# Patient Record
Sex: FEMALE | Race: WHITE | NOT HISPANIC OR LATINO | Employment: UNEMPLOYED | URBAN - METROPOLITAN AREA
[De-identification: names, ages, dates, MRNs, and addresses within clinical notes are randomized per-mention and may not be internally consistent; named-entity substitution may affect disease eponyms.]

---

## 2023-04-25 DIAGNOSIS — I10 ESSENTIAL (PRIMARY) HYPERTENSION: ICD-10-CM

## 2023-04-25 DIAGNOSIS — E03.8 OTHER SPECIFIED HYPOTHYROIDISM: ICD-10-CM

## 2023-04-26 RX ORDER — LEVOTHYROXINE SODIUM 25 UG/1
TABLET ORAL
Qty: 30 TABLET | Refills: 1 | Status: SHIPPED | OUTPATIENT
Start: 2023-04-26 | End: 2023-06-18

## 2023-04-26 RX ORDER — AMLODIPINE BESYLATE 10 MG/1
TABLET ORAL
Qty: 30 TABLET | Refills: 1 | Status: SHIPPED | OUTPATIENT
Start: 2023-04-26 | End: 2023-06-18

## 2023-04-26 RX ORDER — HYDROCHLOROTHIAZIDE 25 MG/1
TABLET ORAL
Qty: 30 TABLET | Refills: 1 | Status: SHIPPED | OUTPATIENT
Start: 2023-04-26 | End: 2023-06-18

## 2023-04-26 RX ORDER — LISINOPRIL 40 MG/1
TABLET ORAL
Qty: 30 TABLET | Refills: 1 | Status: SHIPPED | OUTPATIENT
Start: 2023-04-26 | End: 2023-06-18

## 2023-05-08 ENCOUNTER — OFFICE VISIT (OUTPATIENT)
Dept: PRIMARY CARE | Facility: CLINIC | Age: 53
End: 2023-05-08
Payer: COMMERCIAL

## 2023-05-08 VITALS
SYSTOLIC BLOOD PRESSURE: 140 MMHG | TEMPERATURE: 97.7 F | WEIGHT: 229.6 LBS | BODY MASS INDEX: 37.06 KG/M2 | DIASTOLIC BLOOD PRESSURE: 84 MMHG

## 2023-05-08 DIAGNOSIS — M79.672 PAIN IN BOTH FEET: Primary | ICD-10-CM

## 2023-05-08 DIAGNOSIS — E78.5 HYPERLIPIDEMIA, UNSPECIFIED HYPERLIPIDEMIA TYPE: ICD-10-CM

## 2023-05-08 DIAGNOSIS — E03.8 SUBCLINICAL HYPOTHYROIDISM: ICD-10-CM

## 2023-05-08 DIAGNOSIS — I10 BENIGN ESSENTIAL HYPERTENSION: ICD-10-CM

## 2023-05-08 DIAGNOSIS — E11.9 CONTROLLED TYPE 2 DIABETES MELLITUS WITHOUT COMPLICATION, WITHOUT LONG-TERM CURRENT USE OF INSULIN (MULTI): ICD-10-CM

## 2023-05-08 DIAGNOSIS — D12.6 ADENOMATOUS POLYP OF COLON, UNSPECIFIED PART OF COLON: ICD-10-CM

## 2023-05-08 DIAGNOSIS — D72.829 LEUKOCYTOSIS, UNSPECIFIED TYPE: ICD-10-CM

## 2023-05-08 DIAGNOSIS — R21 RASH: ICD-10-CM

## 2023-05-08 DIAGNOSIS — M79.671 PAIN IN BOTH FEET: Primary | ICD-10-CM

## 2023-05-08 DIAGNOSIS — F32.A DEPRESSIVE DISORDER: ICD-10-CM

## 2023-05-08 DIAGNOSIS — R74.8 ALKALINE PHOSPHATASE ELEVATION: ICD-10-CM

## 2023-05-08 DIAGNOSIS — D64.9 ANEMIA, UNSPECIFIED TYPE: ICD-10-CM

## 2023-05-08 PROBLEM — K63.5 COLON POLYPS: Status: ACTIVE | Noted: 2023-05-08

## 2023-05-08 PROBLEM — K76.0 FATTY LIVER: Status: ACTIVE | Noted: 2023-05-08

## 2023-05-08 PROBLEM — D50.0 IRON DEFICIENCY ANEMIA DUE TO CHRONIC BLOOD LOSS: Status: ACTIVE | Noted: 2019-06-14

## 2023-05-08 PROCEDURE — 3077F SYST BP >= 140 MM HG: CPT | Performed by: INTERNAL MEDICINE

## 2023-05-08 PROCEDURE — 99214 OFFICE O/P EST MOD 30 MIN: CPT | Performed by: INTERNAL MEDICINE

## 2023-05-08 PROCEDURE — 3079F DIAST BP 80-89 MM HG: CPT | Performed by: INTERNAL MEDICINE

## 2023-05-08 PROCEDURE — 1036F TOBACCO NON-USER: CPT | Performed by: INTERNAL MEDICINE

## 2023-05-08 PROCEDURE — 3046F HEMOGLOBIN A1C LEVEL >9.0%: CPT | Performed by: INTERNAL MEDICINE

## 2023-05-08 PROCEDURE — 4010F ACE/ARB THERAPY RXD/TAKEN: CPT | Performed by: INTERNAL MEDICINE

## 2023-05-08 RX ORDER — CHOLECALCIFEROL (VITAMIN D3) 25 MCG
25 TABLET ORAL DAILY
COMMUNITY
Start: 2020-05-21 | End: 2023-11-08 | Stop reason: WASHOUT

## 2023-05-08 RX ORDER — VITAMIN E MIXED 400 UNIT
400 CAPSULE ORAL DAILY
COMMUNITY
End: 2023-09-25 | Stop reason: SDUPTHER

## 2023-05-08 RX ORDER — MULTIVITAMIN
1 TABLET ORAL DAILY
COMMUNITY

## 2023-05-08 RX ORDER — EPINEPHRINE 0.22MG
1 AEROSOL WITH ADAPTER (ML) INHALATION DAILY
COMMUNITY
Start: 2015-03-09

## 2023-05-08 RX ORDER — POLYETHYLENE GLYCOL 3350 17 G/17G
POWDER, FOR SOLUTION ORAL
COMMUNITY
Start: 2016-10-21

## 2023-05-08 RX ORDER — METOPROLOL SUCCINATE 50 MG/1
50 TABLET, EXTENDED RELEASE ORAL DAILY
COMMUNITY
End: 2023-08-29 | Stop reason: SDUPTHER

## 2023-05-08 RX ORDER — INSULIN GLARGINE 100 [IU]/ML
INJECTION, SOLUTION SUBCUTANEOUS
COMMUNITY

## 2023-05-08 RX ORDER — SIMVASTATIN 40 MG/1
40 TABLET, FILM COATED ORAL DAILY
COMMUNITY
End: 2023-08-29 | Stop reason: SDUPTHER

## 2023-05-08 RX ORDER — PANTOPRAZOLE SODIUM 40 MG/1
40 TABLET, DELAYED RELEASE ORAL DAILY
COMMUNITY

## 2023-05-08 RX ORDER — PEN NEEDLE, DIABETIC 31 GX5/16"
NEEDLE, DISPOSABLE MISCELLANEOUS
COMMUNITY
Start: 2023-04-29 | End: 2023-09-25 | Stop reason: SDUPTHER

## 2023-05-08 RX ORDER — BLOOD SUGAR DIAGNOSTIC
STRIP MISCELLANEOUS 2 TIMES DAILY
COMMUNITY

## 2023-05-08 RX ORDER — LANCETS
EACH MISCELLANEOUS
COMMUNITY
Start: 2022-06-03 | End: 2023-11-08 | Stop reason: WASHOUT

## 2023-05-08 RX ORDER — NAPROXEN SODIUM 220 MG/1
TABLET, FILM COATED ORAL
COMMUNITY
Start: 2014-03-06

## 2023-05-08 RX ORDER — METFORMIN HYDROCHLORIDE 1000 MG/1
1000 TABLET ORAL
COMMUNITY
End: 2023-08-29 | Stop reason: SDUPTHER

## 2023-05-08 RX ORDER — GLIMEPIRIDE 4 MG/1
TABLET ORAL
COMMUNITY

## 2023-05-08 RX ORDER — AMLODIPINE BESYLATE 10 MG/1
1 TABLET ORAL DAILY
COMMUNITY
Start: 2018-04-24 | End: 2023-05-08 | Stop reason: SDUPTHER

## 2023-05-08 ASSESSMENT — PATIENT HEALTH QUESTIONNAIRE - PHQ9
1. LITTLE INTEREST OR PLEASURE IN DOING THINGS: NOT AT ALL
2. FEELING DOWN, DEPRESSED OR HOPELESS: NOT AT ALL
SUM OF ALL RESPONSES TO PHQ9 QUESTIONS 1 AND 2: 0

## 2023-05-08 NOTE — PROGRESS NOTES
"Subjective   Patient ID: Yu Small is a 52 y.o. female who presents for Follow-up.    HPI   Overall well  #1 hypertension- no home Bps. At endo office \"good\" (~130/70 per pt).  #2 diabetes- following w/ endo, a1c`8.3  #3 hyperlipidemia- liitle exercise.   #4 anemia/leukocytosis/thrombocytosis- no bleeding/bruising  #5 rt shoulder- f/u ortho, information provided for second opinions.  #6 leg cramps-stable.  #7 tachycardia- normal Holter monitor. Follow-up cardiology  #8 fatty liver- f/u GI. appt pending, s/p Bx.   #9 subclinical hypothyroidism- better on rx. retest 6 mths  #10 colon polyps. s/p colo (dr stout) 4/21--> f/u GI.       Review of Systems   All other systems reviewed and are negative.      Objective   /84   Temp 36.5 °C (97.7 °F)   Wt 104 kg (229 lb 9.6 oz)   BMI 37.06 kg/m²     Physical Exam  Constitutional:       Appearance: Normal appearance.   Cardiovascular:      Rate and Rhythm: Normal rate and regular rhythm.      Pulses: Normal pulses.      Heart sounds: Normal heart sounds. No murmur heard.  Pulmonary:      Effort: Pulmonary effort is normal. No respiratory distress.      Breath sounds: Normal breath sounds. No wheezing, rhonchi or rales.   Neurological:      Mental Status: She is alert.   Psychiatric:         Mood and Affect: Mood normal.         Behavior: Behavior normal.         Thought Content: Thought content normal.         Judgment: Judgment normal.       Lab Results   Component Value Date    WBC 11.2 04/22/2022    HGB 13.1 04/22/2022    HCT 40.7 04/22/2022     04/22/2022    CHOL 127 01/27/2023    TRIG 169 (H) 01/27/2023    HDL 30.4 (A) 01/27/2023    ALT 33 01/27/2023    AST 21 01/27/2023     01/27/2023    K 3.7 01/27/2023    CL 96 (L) 01/27/2023    CREATININE 0.70 01/27/2023    BUN 16 01/27/2023    CO2 30 01/27/2023    TSH 3.34 02/19/2022    INR 1.0 02/10/2021    HGBA1C 9.1 (A) 01/27/2023     par    Assessment/Plan   Problem List Items Addressed This Visit  "         Circulatory    Benign essential hypertension       Digestive    Colon polyps       Endocrine/Metabolic    Controlled diabetes mellitus (CMS/HCC)    Subclinical hypothyroidism       Hematologic    Anemia    Leukocytosis       Other    Alkaline phosphatase elevation    Depressive disorder    Hyperlipidemia     Other Visit Diagnoses       Pain in both feet    -  Primary    Rash              #1 hypertension-  increased a little. Continue current treatment. Diet and exercise reviewed, she is committing to exercise daily.   #2 diabetes- follow-up endocrinology, appt pending  #3 hyperlipidemia- on target   #4 anemia/leukocytosis/thrombocytosis- Follow-up with GI as well as hematology.   #5 rt shoulder- f/u ortho, information provided for second opinions.  #6 leg cramps-stable.  #7 tachycardia- normal Holter monitor. Follow-up cardiology  #8 fatty liver- f/u GI. appt pending, s/p Bx.   Appt pending  #9 subclinical hypothyroidism- better on rx. retest 6 mths  #10 colon polyps. s/p colo (dr stout) 4/21--> f/u GI, appt pending     Increased BMI- discussed diet and exercise.  Follow-up gynecology for Pap/mammogram.  Recommend shingles vaccine

## 2023-06-06 DIAGNOSIS — I10 ESSENTIAL (PRIMARY) HYPERTENSION: ICD-10-CM

## 2023-06-06 DIAGNOSIS — E03.8 OTHER SPECIFIED HYPOTHYROIDISM: ICD-10-CM

## 2023-06-18 RX ORDER — AMLODIPINE BESYLATE 10 MG/1
TABLET ORAL
Qty: 30 TABLET | Refills: 1 | Status: SHIPPED | OUTPATIENT
Start: 2023-06-18 | End: 2023-08-15

## 2023-06-18 RX ORDER — LISINOPRIL 40 MG/1
TABLET ORAL
Qty: 30 TABLET | Refills: 1 | Status: SHIPPED | OUTPATIENT
Start: 2023-06-18 | End: 2023-08-15

## 2023-06-18 RX ORDER — LEVOTHYROXINE SODIUM 25 UG/1
TABLET ORAL
Qty: 30 TABLET | Refills: 1 | Status: SHIPPED | OUTPATIENT
Start: 2023-06-18 | End: 2023-08-15

## 2023-06-18 RX ORDER — HYDROCHLOROTHIAZIDE 25 MG/1
TABLET ORAL
Qty: 30 TABLET | Refills: 1 | Status: SHIPPED | OUTPATIENT
Start: 2023-06-18 | End: 2023-07-31

## 2023-07-31 ENCOUNTER — OFFICE VISIT (OUTPATIENT)
Dept: PRIMARY CARE | Facility: CLINIC | Age: 53
End: 2023-07-31
Payer: COMMERCIAL

## 2023-07-31 VITALS
DIASTOLIC BLOOD PRESSURE: 72 MMHG | SYSTOLIC BLOOD PRESSURE: 140 MMHG | BODY MASS INDEX: 36.96 KG/M2 | TEMPERATURE: 97.3 F | WEIGHT: 229 LBS

## 2023-07-31 DIAGNOSIS — I10 BENIGN ESSENTIAL HYPERTENSION: ICD-10-CM

## 2023-07-31 DIAGNOSIS — E11.9 TYPE 2 DIABETES MELLITUS WITHOUT COMPLICATION, WITHOUT LONG-TERM CURRENT USE OF INSULIN (MULTI): ICD-10-CM

## 2023-07-31 DIAGNOSIS — D64.9 ANEMIA, UNSPECIFIED TYPE: ICD-10-CM

## 2023-07-31 DIAGNOSIS — E11.9 CONTROLLED TYPE 2 DIABETES MELLITUS WITHOUT COMPLICATION, WITHOUT LONG-TERM CURRENT USE OF INSULIN (MULTI): ICD-10-CM

## 2023-07-31 DIAGNOSIS — G62.9 NEUROPATHY: ICD-10-CM

## 2023-07-31 DIAGNOSIS — E03.8 SUBCLINICAL HYPOTHYROIDISM: ICD-10-CM

## 2023-07-31 DIAGNOSIS — E66.01 CLASS 2 SEVERE OBESITY WITH SERIOUS COMORBIDITY AND BODY MASS INDEX (BMI) OF 36.0 TO 36.9 IN ADULT, UNSPECIFIED OBESITY TYPE (MULTI): Primary | ICD-10-CM

## 2023-07-31 DIAGNOSIS — R74.8 ABNORMAL AST AND ALT: ICD-10-CM

## 2023-07-31 DIAGNOSIS — K76.0 FATTY LIVER: ICD-10-CM

## 2023-07-31 PROCEDURE — 3046F HEMOGLOBIN A1C LEVEL >9.0%: CPT | Performed by: INTERNAL MEDICINE

## 2023-07-31 PROCEDURE — 99214 OFFICE O/P EST MOD 30 MIN: CPT | Performed by: INTERNAL MEDICINE

## 2023-07-31 PROCEDURE — 3008F BODY MASS INDEX DOCD: CPT | Performed by: INTERNAL MEDICINE

## 2023-07-31 PROCEDURE — 4010F ACE/ARB THERAPY RXD/TAKEN: CPT | Performed by: INTERNAL MEDICINE

## 2023-07-31 PROCEDURE — 1036F TOBACCO NON-USER: CPT | Performed by: INTERNAL MEDICINE

## 2023-07-31 PROCEDURE — 3077F SYST BP >= 140 MM HG: CPT | Performed by: INTERNAL MEDICINE

## 2023-07-31 PROCEDURE — 3078F DIAST BP <80 MM HG: CPT | Performed by: INTERNAL MEDICINE

## 2023-07-31 RX ORDER — GABAPENTIN 100 MG/1
100 CAPSULE ORAL NIGHTLY
Qty: 90 CAPSULE | Refills: 1 | Status: SHIPPED | OUTPATIENT
Start: 2023-07-31 | End: 2024-05-15

## 2023-07-31 RX ORDER — CHLORTHALIDONE 25 MG/1
25 TABLET ORAL DAILY
Qty: 90 TABLET | Refills: 2 | Status: SHIPPED | OUTPATIENT
Start: 2023-07-31 | End: 2023-09-01

## 2023-07-31 ASSESSMENT — PATIENT HEALTH QUESTIONNAIRE - PHQ9
2. FEELING DOWN, DEPRESSED OR HOPELESS: NOT AT ALL
1. LITTLE INTEREST OR PLEASURE IN DOING THINGS: NOT AT ALL
SUM OF ALL RESPONSES TO PHQ9 QUESTIONS 1 AND 2: 0

## 2023-07-31 NOTE — PROGRESS NOTES
"Subjective   Patient ID: Yu Small is a 53 y.o. female who presents for Follow-up (HTN / neuropathy in feet).    HPI   Overall well  #1 hypertension- no home Bps. At endo office \"good\" (~130/70 per pt).  #2 diabetes- following w/ endo, a1c`8.3  #3 hyperlipidemia- liitle exercise.   #4 anemia/leukocytosis/thrombocytosis- no bleeding/bruising  #5 rt shoulder- f/u ortho, information provided for second opinions.  #6 leg cramps-stable.  #7 tachycardia- normal Holter monitor. Follow-up cardiology  #8 fatty liver- f/u GI. appt pending, s/p Bx.   #9 subclinical hypothyroidism- better on rx. retest 6 mths  #10 colon polyps. s/p colo (dr stout) 4/21--> f/u GI.       Review of Systems   All other systems reviewed and are negative.      Objective   /72   Temp 36.3 °C (97.3 °F)   Wt 104 kg (229 lb)   BMI 36.96 kg/m²     Physical Exam  Constitutional:       Appearance: Normal appearance.   Cardiovascular:      Rate and Rhythm: Normal rate and regular rhythm.      Pulses: Normal pulses.      Heart sounds: Normal heart sounds. No murmur heard.  Pulmonary:      Effort: Pulmonary effort is normal. No respiratory distress.      Breath sounds: Normal breath sounds. No wheezing, rhonchi or rales.   Neurological:      Mental Status: She is alert.   Psychiatric:         Mood and Affect: Mood normal.         Behavior: Behavior normal.         Thought Content: Thought content normal.         Judgment: Judgment normal.       Lab Results   Component Value Date    WBC 11.2 04/22/2022    HGB 13.1 04/22/2022    HCT 40.7 04/22/2022     04/22/2022    CHOL 127 01/27/2023    TRIG 169 (H) 01/27/2023    HDL 30.4 (A) 01/27/2023    ALT 33 01/27/2023    AST 21 01/27/2023     01/27/2023    K 3.7 01/27/2023    CL 96 (L) 01/27/2023    CREATININE 0.70 01/27/2023    BUN 16 01/27/2023    CO2 30 01/27/2023    TSH 3.34 02/19/2022    INR 1.0 02/10/2021    HGBA1C 9.1 (A) 01/27/2023     par    Assessment/Plan   Problem List Items " Addressed This Visit    None  Visit Diagnoses       Class 2 severe obesity with serious comorbidity and body mass index (BMI) of 36.0 to 36.9 in adult, unspecified obesity type (CMS/HCC)    -  Primary          #1 hypertension- stop hydrochlorothiazide, will change to chlorthaladone.  Labs 1 week.  Increase exercise.   #2 diabetes- follow-up endocrinology, appt pending  #3 hyperlipidemia- on target   #4 anemia/leukocytosis/thrombocytosis- Follow-up with GI as well as hematology.   #5 rt shoulder- f/u ortho, information provided for second opinions.  #6 leg cramps-stable.  #7 tachycardia- normal Holter monitor. Follow-up cardiology  #8 fatty liver- f/u GI. appt pending, s/p Bx.   Appt pending  #9 subclinical hypothyroidism- better on rx. retest 6 mths  #10 colon polyps. s/p colo (dr stout) 4/21--> f/u GI, appt pending 2/24  #11 neuropathy- reviewed foot care.  f/u  w/ podiatry.  Gabapentin 100 mg po qhs     Increased BMI- discussed diet and exercise.  Follow-up gynecology for Pap/mammogram.  Recommend shingles vaccine

## 2023-08-09 ENCOUNTER — APPOINTMENT (OUTPATIENT)
Dept: PRIMARY CARE | Facility: CLINIC | Age: 53
End: 2023-08-09
Payer: COMMERCIAL

## 2023-08-11 DIAGNOSIS — I10 ESSENTIAL (PRIMARY) HYPERTENSION: ICD-10-CM

## 2023-08-11 DIAGNOSIS — E03.8 OTHER SPECIFIED HYPOTHYROIDISM: ICD-10-CM

## 2023-08-15 RX ORDER — AMLODIPINE BESYLATE 10 MG/1
TABLET ORAL
Qty: 30 TABLET | Refills: 1 | Status: SHIPPED | OUTPATIENT
Start: 2023-08-15 | End: 2023-10-26 | Stop reason: SDUPTHER

## 2023-08-15 RX ORDER — LEVOTHYROXINE SODIUM 25 UG/1
TABLET ORAL
Qty: 30 TABLET | Refills: 1 | Status: SHIPPED | OUTPATIENT
Start: 2023-08-15 | End: 2023-10-26 | Stop reason: SDUPTHER

## 2023-08-15 RX ORDER — LISINOPRIL 40 MG/1
TABLET ORAL
Qty: 30 TABLET | Refills: 1 | Status: SHIPPED | OUTPATIENT
Start: 2023-08-15 | End: 2023-10-26 | Stop reason: SDUPTHER

## 2023-08-16 ENCOUNTER — LAB (OUTPATIENT)
Dept: LAB | Facility: LAB | Age: 53
End: 2023-08-16
Payer: COMMERCIAL

## 2023-08-16 DIAGNOSIS — E03.8 SUBCLINICAL HYPOTHYROIDISM: ICD-10-CM

## 2023-08-16 DIAGNOSIS — I10 BENIGN ESSENTIAL HYPERTENSION: ICD-10-CM

## 2023-08-16 LAB
ALANINE AMINOTRANSFERASE (SGPT) (U/L) IN SER/PLAS: 31 U/L (ref 7–45)
ALBUMIN (G/DL) IN SER/PLAS: 4.3 G/DL (ref 3.4–5)
ALKALINE PHOSPHATASE (U/L) IN SER/PLAS: 94 U/L (ref 33–110)
ANION GAP IN SER/PLAS: 16 MMOL/L (ref 10–20)
ASPARTATE AMINOTRANSFERASE (SGOT) (U/L) IN SER/PLAS: 20 U/L (ref 9–39)
BILIRUBIN TOTAL (MG/DL) IN SER/PLAS: 0.4 MG/DL (ref 0–1.2)
CALCIUM (MG/DL) IN SER/PLAS: 10 MG/DL (ref 8.6–10.3)
CARBON DIOXIDE, TOTAL (MMOL/L) IN SER/PLAS: 27 MMOL/L (ref 21–32)
CHLORIDE (MMOL/L) IN SER/PLAS: 98 MMOL/L (ref 98–107)
CREATININE (MG/DL) IN SER/PLAS: 0.81 MG/DL (ref 0.5–1.05)
GFR FEMALE: 86 ML/MIN/1.73M2
GLUCOSE (MG/DL) IN SER/PLAS: 144 MG/DL (ref 74–99)
POTASSIUM (MMOL/L) IN SER/PLAS: 4.4 MMOL/L (ref 3.5–5.3)
PROTEIN TOTAL: 7.6 G/DL (ref 6.4–8.2)
SODIUM (MMOL/L) IN SER/PLAS: 137 MMOL/L (ref 136–145)
THYROTROPIN (MIU/L) IN SER/PLAS BY DETECTION LIMIT <= 0.05 MIU/L: 3.82 MIU/L (ref 0.44–3.98)
UREA NITROGEN (MG/DL) IN SER/PLAS: 17 MG/DL (ref 6–23)

## 2023-08-16 PROCEDURE — 80053 COMPREHEN METABOLIC PANEL: CPT

## 2023-08-16 PROCEDURE — 84443 ASSAY THYROID STIM HORMONE: CPT

## 2023-08-16 PROCEDURE — 36415 COLL VENOUS BLD VENIPUNCTURE: CPT

## 2023-08-21 ENCOUNTER — TELEPHONE (OUTPATIENT)
Dept: PRIMARY CARE | Facility: CLINIC | Age: 53
End: 2023-08-21
Payer: COMMERCIAL

## 2023-08-21 NOTE — TELEPHONE ENCOUNTER
Pt left a msg  stating that you recently started her on a new diuretic, and now she is having side effects of HA's , nausea, diarrhea, and blurred vision.

## 2023-08-22 NOTE — TELEPHONE ENCOUNTER
I spoke with the pt and she said that these symptoms all started a couple days after starting the Chlorthalidone on Aug 6th.  She thought she read that this was a med related to Sulfa.  She is allergic to Sulfas.  She said that with the Gabapentin, she has only taken it a couple times.

## 2023-08-22 NOTE — TELEPHONE ENCOUNTER
Called and left a detailed vm asking her to call back with the date of when her symptoms started.

## 2023-08-28 ENCOUNTER — TELEPHONE (OUTPATIENT)
Dept: PRIMARY CARE | Facility: CLINIC | Age: 53
End: 2023-08-28
Payer: COMMERCIAL

## 2023-08-28 NOTE — TELEPHONE ENCOUNTER
[facility] called and let us know that the RX refills sent to them failed.  They are asking for them to be resent.  Metoprolol ER 50 mg, #90, take 1 daily, Simvastatin 40 mg #90, take 1 daily, and Metformin 1000 mg #180, take 1 twice daily.

## 2023-08-29 DIAGNOSIS — E78.5 HYPERLIPIDEMIA, UNSPECIFIED HYPERLIPIDEMIA TYPE: ICD-10-CM

## 2023-08-29 DIAGNOSIS — I10 BENIGN ESSENTIAL HYPERTENSION: ICD-10-CM

## 2023-08-29 DIAGNOSIS — E11.9 TYPE 2 DIABETES MELLITUS WITHOUT COMPLICATION, WITHOUT LONG-TERM CURRENT USE OF INSULIN (MULTI): ICD-10-CM

## 2023-09-01 ENCOUNTER — OFFICE VISIT (OUTPATIENT)
Dept: PRIMARY CARE | Facility: CLINIC | Age: 53
End: 2023-09-01
Payer: COMMERCIAL

## 2023-09-01 VITALS — DIASTOLIC BLOOD PRESSURE: 70 MMHG | SYSTOLIC BLOOD PRESSURE: 158 MMHG | TEMPERATURE: 98 F

## 2023-09-01 DIAGNOSIS — I10 BENIGN ESSENTIAL HYPERTENSION: Primary | ICD-10-CM

## 2023-09-01 PROCEDURE — 99213 OFFICE O/P EST LOW 20 MIN: CPT | Performed by: INTERNAL MEDICINE

## 2023-09-01 PROCEDURE — 4010F ACE/ARB THERAPY RXD/TAKEN: CPT | Performed by: INTERNAL MEDICINE

## 2023-09-01 PROCEDURE — 1036F TOBACCO NON-USER: CPT | Performed by: INTERNAL MEDICINE

## 2023-09-01 PROCEDURE — 3046F HEMOGLOBIN A1C LEVEL >9.0%: CPT | Performed by: INTERNAL MEDICINE

## 2023-09-01 PROCEDURE — 3077F SYST BP >= 140 MM HG: CPT | Performed by: INTERNAL MEDICINE

## 2023-09-01 PROCEDURE — 3078F DIAST BP <80 MM HG: CPT | Performed by: INTERNAL MEDICINE

## 2023-09-01 PROCEDURE — 3008F BODY MASS INDEX DOCD: CPT | Performed by: INTERNAL MEDICINE

## 2023-09-01 RX ORDER — HYDROCHLOROTHIAZIDE 25 MG/1
25 TABLET ORAL
COMMUNITY
Start: 2023-04-29 | End: 2023-09-01 | Stop reason: SDUPTHER

## 2023-09-01 RX ORDER — CARVEDILOL 3.12 MG/1
3.12 TABLET ORAL
Qty: 60 TABLET | Refills: 2 | Status: SHIPPED | OUTPATIENT
Start: 2023-09-01 | End: 2023-09-26 | Stop reason: SDUPTHER

## 2023-09-01 RX ORDER — METOPROLOL SUCCINATE 50 MG/1
50 TABLET, EXTENDED RELEASE ORAL DAILY
Qty: 90 TABLET | Refills: 3 | Status: SHIPPED | OUTPATIENT
Start: 2023-09-01 | End: 2023-11-08 | Stop reason: WASHOUT

## 2023-09-01 RX ORDER — HYDROCHLOROTHIAZIDE 25 MG/1
25 TABLET ORAL
Qty: 90 TABLET | Refills: 3 | Status: SHIPPED | OUTPATIENT
Start: 2023-09-01 | End: 2024-02-15

## 2023-09-01 RX ORDER — METFORMIN HYDROCHLORIDE 1000 MG/1
1000 TABLET ORAL
Qty: 180 TABLET | Refills: 3 | Status: SHIPPED | OUTPATIENT
Start: 2023-09-01 | End: 2024-02-15

## 2023-09-01 RX ORDER — SIMVASTATIN 40 MG/1
40 TABLET, FILM COATED ORAL DAILY
Qty: 90 TABLET | Refills: 3 | Status: SHIPPED | OUTPATIENT
Start: 2023-09-01 | End: 2024-02-15

## 2023-09-01 ASSESSMENT — PATIENT HEALTH QUESTIONNAIRE - PHQ9
2. FEELING DOWN, DEPRESSED OR HOPELESS: NOT AT ALL
SUM OF ALL RESPONSES TO PHQ9 QUESTIONS 1 AND 2: 0
1. LITTLE INTEREST OR PLEASURE IN DOING THINGS: NOT AT ALL

## 2023-09-01 ASSESSMENT — ENCOUNTER SYMPTOMS
DEPRESSION: 0
LOSS OF SENSATION IN FEET: 1
OCCASIONAL FEELINGS OF UNSTEADINESS: 0

## 2023-09-01 NOTE — PROGRESS NOTES
"Subjective   Patient ID: Yu Small is a 53 y.o. female who presents for Discull medications.    HPI   Overall well  #1 hypertension- no home Bps. At endo office \"good\" (~130/70 per pt).  #2 diabetes- following w/ endo, a1c`8.3  #3 hyperlipidemia- liitle exercise.   #4 anemia/leukocytosis/thrombocytosis- no bleeding/bruising  #5 rt shoulder- f/u ortho, information provided for second opinions.  #6 leg cramps-stable.  #7 tachycardia- normal Holter monitor. Follow-up cardiology  #8 fatty liver- f/u GI. appt pending, s/p Bx.   #9 subclinical hypothyroidism- better on rx. retest 6 mths  #10 colon polyps. s/p colo (dr stout) 4/21--> f/u GI.       Review of Systems   All other systems reviewed and are negative.      Objective   /70 (BP Location: Left arm, Patient Position: Sitting, BP Cuff Size: Large adult)   Temp 36.7 °C (98 °F) (Skin)     Physical Exam  Constitutional:       Appearance: Normal appearance.   Cardiovascular:      Rate and Rhythm: Normal rate and regular rhythm.      Pulses: Normal pulses.      Heart sounds: Normal heart sounds. No murmur heard.  Pulmonary:      Effort: Pulmonary effort is normal. No respiratory distress.      Breath sounds: Normal breath sounds. No wheezing, rhonchi or rales.   Neurological:      Mental Status: She is alert.   Psychiatric:         Mood and Affect: Mood normal.         Behavior: Behavior normal.         Thought Content: Thought content normal.         Judgment: Judgment normal.     Lab Results   Component Value Date    WBC 11.2 04/22/2022    HGB 13.1 04/22/2022    HCT 40.7 04/22/2022     04/22/2022    CHOL 127 01/27/2023    TRIG 169 (H) 01/27/2023    HDL 30.4 (A) 01/27/2023    ALT 31 08/16/2023    AST 20 08/16/2023     08/16/2023    K 4.4 08/16/2023    CL 98 08/16/2023    CREATININE 0.81 08/16/2023    BUN 17 08/16/2023    CO2 27 08/16/2023    TSH 3.82 08/16/2023    INR 1.0 02/10/2021    HGBA1C 9.1 (A) 01/27/2023     A1c- (8/23/23) " =6.6    Assessment/Plan   Problem List Items Addressed This Visit    None      #1 hypertension- resume hydrochlorothiazide, will change  metoprolol to carvedilol.  Patient will follow-up 2 weeks for blood pressure.  #2 diabetes- follow-up endocrinology, appt pending  #3 hyperlipidemia- on target   #4 anemia/leukocytosis/thrombocytosis- Follow-up with GI as well as hematology.   #5 rt shoulder- f/u ortho, information provided for second opinions.  #6 leg cramps-stable.  #7 tachycardia- normal Holter monitor. Follow-up cardiology  #8 fatty liver- f/u GI. appt pending, s/p Bx.   Appt pending 2/24  #9 subclinical hypothyroidism- better on rx. retest 6 mths  #10 colon polyps. s/p colo (dr stout) 4/21--> f/u GI, appt pending 2/24  #11 neuropathy- reviewed foot care.  f/u  w/ podiatry.  Gabapentin 100 mg po qhs     Increased BMI- discussed diet and exercise.  Follow-up gynecology for Pap/mammogram.  Recommend shingles vaccine

## 2023-09-22 ENCOUNTER — TELEPHONE (OUTPATIENT)
Dept: PRIMARY CARE | Facility: CLINIC | Age: 53
End: 2023-09-22
Payer: COMMERCIAL

## 2023-09-22 NOTE — TELEPHONE ENCOUNTER
Pt left a msg to follow up with her BP's since starting a new BP medication.     9/16     147/78  9/17     181/91  9/18      161/85  161/84  9/19     149/73   9/20     159/83  9/21     189/89  151/77  9/22     145/78  140/79        She said the HR's are 82-97.

## 2023-09-22 NOTE — TELEPHONE ENCOUNTER
Spoke with the pt and relayed the msg with understanding.   PAST SURGICAL HISTORY:  No significant past surgical history        normal...

## 2023-09-25 ENCOUNTER — TELEPHONE (OUTPATIENT)
Dept: PRIMARY CARE | Facility: CLINIC | Age: 53
End: 2023-09-25
Payer: COMMERCIAL

## 2023-09-25 PROBLEM — M75.01 ADHESIVE CAPSULITIS OF RIGHT SHOULDER: Status: ACTIVE | Noted: 2023-09-25

## 2023-09-25 PROBLEM — M25.519 SHOULDER PAIN: Status: ACTIVE | Noted: 2023-09-25

## 2023-09-25 PROBLEM — R00.8 OTHER ABNORMALITIES OF HEART BEAT: Status: ACTIVE | Noted: 2023-09-25

## 2023-09-25 PROBLEM — K90.9 IMPAIRED INTESTINAL ABSORPTION (HHS-HCC): Status: ACTIVE | Noted: 2021-11-12

## 2023-09-25 PROBLEM — R21 RASH AND OTHER NONSPECIFIC SKIN ERUPTION: Status: ACTIVE | Noted: 2019-12-18

## 2023-09-25 PROBLEM — L57.9 SKIN CHANGES DUE TO CHRONIC EXPOSURE TO NONIONIZING RADIATION, UNSPECIFIED: Status: ACTIVE | Noted: 2019-12-18

## 2023-09-25 PROBLEM — G47.62 NOCTURNAL LEG CRAMPS: Status: ACTIVE | Noted: 2023-09-25

## 2023-09-25 PROBLEM — R00.0 TACHYCARDIA: Status: ACTIVE | Noted: 2023-09-25

## 2023-09-25 PROBLEM — D75.839 THROMBOCYTOSIS: Status: ACTIVE | Noted: 2023-09-25

## 2023-09-25 PROBLEM — M25.859 FEMORAL ACETABULAR IMPINGEMENT: Status: ACTIVE | Noted: 2023-09-25

## 2023-09-25 PROBLEM — D22.5 MELANOCYTIC NEVI OF TRUNK: Status: ACTIVE | Noted: 2019-12-18

## 2023-09-25 PROBLEM — D22.39 MELANOCYTIC NEVI OF OTHER PARTS OF FACE: Status: ACTIVE | Noted: 2019-12-18

## 2023-09-25 PROBLEM — D22.70 MELANOCYTIC NEVI OF UNSPECIFIED LOWER LIMB, INCLUDING HIP: Status: ACTIVE | Noted: 2019-12-18

## 2023-09-25 PROBLEM — R19.5 OCCULT BLOOD POSITIVE STOOL: Status: ACTIVE | Noted: 2023-09-25

## 2023-09-25 PROBLEM — L82.1 OTHER SEBORRHEIC KERATOSIS: Status: ACTIVE | Noted: 2019-12-18

## 2023-09-25 PROBLEM — L85.3 XEROSIS CUTIS: Status: ACTIVE | Noted: 2019-12-18

## 2023-09-25 PROBLEM — L81.7 PIGMENTED PURPURIC DERMATOSIS: Status: ACTIVE | Noted: 2019-12-18

## 2023-09-25 PROBLEM — K59.09 CHRONIC CONSTIPATION: Status: ACTIVE | Noted: 2023-09-25

## 2023-09-25 PROBLEM — D70.9 NEUTROPENIA (CMS-HCC): Status: ACTIVE | Noted: 2023-09-25

## 2023-09-25 PROBLEM — M25.559 HIP PAIN: Status: ACTIVE | Noted: 2023-09-25
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
## 2023-09-25 PROBLEM — S73.192D ACETABULAR LABRUM TEAR, LEFT, SUBSEQUENT ENCOUNTER: Status: ACTIVE | Noted: 2023-09-25

## 2023-09-25 PROBLEM — D22.60 MELANOCYTIC NEVI OF UNSPECIFIED UPPER LIMB, INCLUDING SHOULDER: Status: ACTIVE | Noted: 2019-12-18

## 2023-09-25 RX ORDER — BLOOD-GLUCOSE CONTROL, NORMAL
EACH MISCELLANEOUS 2 TIMES DAILY
COMMUNITY
Start: 2023-03-30

## 2023-09-25 RX ORDER — PEN NEEDLE, DIABETIC 30 GX3/16"
NEEDLE, DISPOSABLE MISCELLANEOUS 2 TIMES DAILY
COMMUNITY
Start: 2023-04-29

## 2023-09-25 RX ORDER — TRIAMCINOLONE ACETONIDE 0.25 MG/G
CREAM TOPICAL
COMMUNITY
Start: 2019-12-18 | End: 2023-11-08 | Stop reason: WASHOUT

## 2023-09-25 RX ORDER — FLUOCINONIDE 0.5 MG/G
CREAM TOPICAL
COMMUNITY
Start: 2017-04-27 | End: 2023-11-08 | Stop reason: WASHOUT

## 2023-09-25 RX ORDER — VITAMIN E MIXED 400 UNIT
400 CAPSULE ORAL EVERY MORNING
COMMUNITY

## 2023-09-25 NOTE — TELEPHONE ENCOUNTER
Pt left a msg stating that she was told to increase her Carvedilol to 6.25 mg daily.  She will run out of her 3.125 mg before the end of the week.  She is asking for a new script for the 6.25 mg to be sent to  [facility].

## 2023-09-26 DIAGNOSIS — I10 BENIGN ESSENTIAL HYPERTENSION: ICD-10-CM

## 2023-09-26 RX ORDER — CARVEDILOL 6.25 MG/1
6.25 TABLET ORAL DAILY
Qty: 30 TABLET | Refills: 11 | Status: SHIPPED | OUTPATIENT
Start: 2023-09-26 | End: 2023-11-08 | Stop reason: SDUPTHER

## 2023-10-02 ENCOUNTER — OFFICE VISIT (OUTPATIENT)
Dept: PODIATRY | Facility: CLINIC | Age: 53
End: 2023-10-02
Payer: COMMERCIAL

## 2023-10-02 VITALS — TEMPERATURE: 98 F | HEART RATE: 94 BPM | DIASTOLIC BLOOD PRESSURE: 73 MMHG | SYSTOLIC BLOOD PRESSURE: 131 MMHG

## 2023-10-02 DIAGNOSIS — S90.32XD CONTUSION OF FOOT OR HEEL, LEFT, SUBSEQUENT ENCOUNTER: Primary | ICD-10-CM

## 2023-10-02 PROCEDURE — 4010F ACE/ARB THERAPY RXD/TAKEN: CPT | Performed by: PODIATRIST

## 2023-10-02 PROCEDURE — 3075F SYST BP GE 130 - 139MM HG: CPT | Performed by: PODIATRIST

## 2023-10-02 PROCEDURE — 3078F DIAST BP <80 MM HG: CPT | Performed by: PODIATRIST

## 2023-10-02 PROCEDURE — 99212 OFFICE O/P EST SF 10 MIN: CPT | Performed by: PODIATRIST

## 2023-10-02 PROCEDURE — 3008F BODY MASS INDEX DOCD: CPT | Performed by: PODIATRIST

## 2023-10-02 PROCEDURE — 3046F HEMOGLOBIN A1C LEVEL >9.0%: CPT | Performed by: PODIATRIST

## 2023-10-02 PROCEDURE — 1036F TOBACCO NON-USER: CPT | Performed by: PODIATRIST

## 2023-10-02 NOTE — PROGRESS NOTES
CC:  left foot pain    HPI:  Pateint  presents complaining of left  foot pain has been present for 4 weeks. Pain is described as mild and rated as 2/10 in severity. Pain is exacerbated with activity and relieved  by rest. Has been wearing the cam walker and had xrays, posterior heel spur and some nonspecific swelling midfoot. Patient denies any other pedal complaints.     PCP: Dr. Rey  Last visit: 2023     PMH  Past Medical History:   Diagnosis Date    Anogenital (venereal) warts     Genital warts    Depression, unspecified 11/03/2013    Depression    Encounter for screening for malignant neoplasm of vagina     Vaginal Pap smear    Osteoarthritis of knee, unspecified 11/03/2013    Osteoarthritis of knee    Other conditions influencing health status     History of pregnancy     MEDS    Current Outpatient Medications:     Accu-Chek Guide test strips strip, 2 times a day., Disp: , Rfl:     Accu-Chek Softclix Lancets misc, SUBCUTANEOUSLY TWICE DAILY, Disp: , Rfl:     amLODIPine (Norvasc) 10 mg tablet, TAKE 1 TABLET BY MOUTH DAILY, Disp: 30 tablet, Rfl: 1    aspirin 81 mg chewable tablet, Chew once daily., Disp: , Rfl:     Basaglar KwikPen U-100 Insulin 100 unit/mL (3 mL) pen, inject 65 units Subcutaneously once a day, Disp: , Rfl:     carvedilol (Coreg) 6.25 mg tablet, Take 1 tablet (6.25 mg) by mouth once daily., Disp: 30 tablet, Rfl: 11    cholecalciferol (Vitamin D-3) 25 MCG (1000 UT) tablet, Take by mouth., Disp: , Rfl:     coenzyme Q-10 100 mg capsule, Take 1 capsule (100 mg) by mouth once daily., Disp: , Rfl:     fluocinonide 0.05 % cream, 1 Application, Disp: , Rfl:     gabapentin (Neurontin) 100 mg capsule, Take 1 capsule (100 mg) by mouth once daily at bedtime., Disp: 90 capsule, Rfl: 1    glimepiride (Amaryl) 4 mg tablet, TAKE 1 TABLET BY MOUTH EVERY DAY with breakfast or the first main meal OF the day 30, Disp: , Rfl:     hydroCHLOROthiazide (HYDRODiuril) 25 mg tablet, Take 1 tablet (25 mg) by mouth once  "daily., Disp: 90 tablet, Rfl: 3    lancets 30 gauge misc, 2 times a day., Disp: , Rfl:     levothyroxine (Synthroid, Levoxyl) 25 mcg tablet, TAKE 1 TABLET BY MOUTH EVERY DAY, Disp: 30 tablet, Rfl: 1    lisinopril 40 mg tablet, TAKE 1 TABLET BY MOUTH EVERY DAY, Disp: 30 tablet, Rfl: 1    metFORMIN (Glucophage) 1,000 mg tablet, Take 1 tablet (1,000 mg) by mouth 2 times a day with meals., Disp: 180 tablet, Rfl: 3    metoprolol succinate XL (Toprol-XL) 50 mg 24 hr tablet, Take 1 tablet (50 mg) by mouth once daily., Disp: 90 tablet, Rfl: 3    multivitamin tablet, Take 1 tablet by mouth once daily., Disp: , Rfl:     Ozempic 2 mg/dose (8 mg/3 mL) pen injector, use SUBCUTANEOUSLY once a week AS DIRECTED, Disp: , Rfl:     pantoprazole (ProtoNix) 40 mg EC tablet, Take 1 tablet (40 mg) by mouth once daily., Disp: , Rfl:     pen needle, diabetic 32 gauge x 5/32\" needle, 2 times a day., Disp: , Rfl:     polyethylene glycol (Glycolax) 17 gram/dose powder, Take by mouth., Disp: , Rfl:     simvastatin (Zocor) 40 mg tablet, Take 1 tablet (40 mg) by mouth once daily., Disp: 90 tablet, Rfl: 3    triamcinolone (Kenalog) 0.025 % cream, 1 Application, Disp: , Rfl:     vitamin E 180 mg (400 unit) capsule, Take 1 capsule (400 Units) by mouth once daily in the morning., Disp: , Rfl:   Allergies  Allergies   Allergen Reactions    Chlorthalidone Headache    Hydrocodone Unknown    Hydrocodone-Acetaminophen Itching    Levallorphan Unknown    Oxycodone Itching    Oxycodone-Acetaminophen Itching    Sulfamethoxazole Unknown    Trimethoprim Unknown    Acetaminophen Unknown    Hydromorphone Rash    Other Rash    Sulfamethoxazole-Trimethoprim Rash     Social History     Socioeconomic History    Marital status:      Spouse name: Not on file    Number of children: Not on file    Years of education: Not on file    Highest education level: Not on file   Occupational History    Not on file   Tobacco Use    Smoking status: Never     Passive " exposure: Never    Smokeless tobacco: Never   Substance and Sexual Activity    Alcohol use: Yes     Comment: rare    Drug use: Never    Sexual activity: Not on file   Other Topics Concern    Not on file   Social History Narrative    Not on file     Social Determinants of Health     Financial Resource Strain: Not on file   Food Insecurity: Not on file   Transportation Needs: Not on file   Physical Activity: Not on file   Stress: Not on file   Social Connections: Not on file   Intimate Partner Violence: Not on file   Housing Stability: Not on file     Family History   Problem Relation Name Age of Onset    Breast cancer Mother      Hypertension Father      Leukemia Father      Basal cell carcinoma Father       Past Surgical History:   Procedure Laterality Date    BREAST SURGERY  04/03/2014    Breast Surgery Reduction Procedure    HYSTEROSCOPY  02/19/2017    Hysteroscopy With Endometrial Ablation    OTHER SURGICAL HISTORY  11/19/2020    Shoulder surgery    OTHER SURGICAL HISTORY  04/03/2014    Oral Surgery       REVIEW OF SYSTEMS    + as noted above in HPI.       Physical examination:   On General Observation: Patient is a pleasant, cooperative, well developed 53 y.o.  adult female. The patient is alert and oriented to time, place and person. Patient has normal affect and mood.  /73   Pulse 94   Temp 36.7 °C (98 °F)     Vascular:  DP and PT pulses are 2/4 b/l.  no edema noted. ,mild varicosities b/l.  CFT  5 seconds to all digits bilateral.  Skin temperature is warm to warm from proximal to distal bilateral.      Muscular: Strength is 5/5 b/l.  Motor strength is normal and symmetric proximally, as well as distally, in all four extremities. Good mobility of all extremities.  Slight tenderness is present left dorsolateral foot.     Neuro:  Proprioception intact.   Sensation to vibration is  intact. Protective sensation decreased  at all pedal sites via Langley Sam 5.07 monofilament bilateral.  Light touch  intac bilateral.     Derm:  No rashes, lesions, or ecchymosis.         ASSESSMENT:    Contusion left foot, improving  Heel Spur  DM     PLAN:   A comprehensive history and physical examination were preformed. The patient was educated on clinical findings, diagnosis and treatment plans. Patient understands all that has been explained and all questions were answered to apparent satisfaction.     - Exam  Reviewed xrays with pt, reviewed plan, is better  Recommend PT, she has TENS at home, price, cam walker if active, ankle/foot sleeve  F/U if any issues  - Follow up 9 weeks for dm foot care.       Deonte Wright DPM

## 2023-10-25 ENCOUNTER — TELEPHONE (OUTPATIENT)
Dept: PRIMARY CARE | Facility: CLINIC | Age: 53
End: 2023-10-25

## 2023-10-25 NOTE — TELEPHONE ENCOUNTER
Pt left a msg asking for refills of Levothyroxine 25 mcg, Lisinopril 40 mg, and  Amlodipine 10 mg.  She had an appt on 11/8/23.   Pharm is [facility].

## 2023-10-26 DIAGNOSIS — E03.8 OTHER SPECIFIED HYPOTHYROIDISM: ICD-10-CM

## 2023-10-26 DIAGNOSIS — I10 ESSENTIAL (PRIMARY) HYPERTENSION: ICD-10-CM

## 2023-10-27 RX ORDER — LEVOTHYROXINE SODIUM 25 UG/1
25 TABLET ORAL DAILY
Qty: 30 TABLET | Refills: 11 | Status: SHIPPED | OUTPATIENT
Start: 2023-10-27 | End: 2024-02-15

## 2023-10-27 RX ORDER — AMLODIPINE BESYLATE 10 MG/1
10 TABLET ORAL DAILY
Qty: 30 TABLET | Refills: 11 | Status: SHIPPED | OUTPATIENT
Start: 2023-10-27 | End: 2024-02-15

## 2023-10-27 RX ORDER — LISINOPRIL 40 MG/1
40 TABLET ORAL DAILY
Qty: 30 TABLET | Refills: 11 | Status: SHIPPED | OUTPATIENT
Start: 2023-10-27 | End: 2024-02-15

## 2023-11-08 ENCOUNTER — OFFICE VISIT (OUTPATIENT)
Dept: PRIMARY CARE | Facility: CLINIC | Age: 53
End: 2023-11-08
Payer: COMMERCIAL

## 2023-11-08 VITALS
WEIGHT: 226 LBS | DIASTOLIC BLOOD PRESSURE: 64 MMHG | TEMPERATURE: 97.9 F | BODY MASS INDEX: 36.48 KG/M2 | SYSTOLIC BLOOD PRESSURE: 120 MMHG

## 2023-11-08 DIAGNOSIS — K76.0 FATTY LIVER: ICD-10-CM

## 2023-11-08 DIAGNOSIS — Z23 NEED FOR INFLUENZA VACCINATION: ICD-10-CM

## 2023-11-08 DIAGNOSIS — E78.5 HYPERLIPIDEMIA, UNSPECIFIED HYPERLIPIDEMIA TYPE: Primary | ICD-10-CM

## 2023-11-08 DIAGNOSIS — I10 BENIGN ESSENTIAL HYPERTENSION: ICD-10-CM

## 2023-11-08 DIAGNOSIS — E11.9 CONTROLLED TYPE 2 DIABETES MELLITUS WITHOUT COMPLICATION, WITHOUT LONG-TERM CURRENT USE OF INSULIN (MULTI): ICD-10-CM

## 2023-11-08 PROCEDURE — 1036F TOBACCO NON-USER: CPT | Performed by: INTERNAL MEDICINE

## 2023-11-08 PROCEDURE — 90686 IIV4 VACC NO PRSV 0.5 ML IM: CPT | Performed by: INTERNAL MEDICINE

## 2023-11-08 PROCEDURE — 3074F SYST BP LT 130 MM HG: CPT | Performed by: INTERNAL MEDICINE

## 2023-11-08 PROCEDURE — 99214 OFFICE O/P EST MOD 30 MIN: CPT | Performed by: INTERNAL MEDICINE

## 2023-11-08 PROCEDURE — 3046F HEMOGLOBIN A1C LEVEL >9.0%: CPT | Performed by: INTERNAL MEDICINE

## 2023-11-08 PROCEDURE — 3078F DIAST BP <80 MM HG: CPT | Performed by: INTERNAL MEDICINE

## 2023-11-08 PROCEDURE — 4010F ACE/ARB THERAPY RXD/TAKEN: CPT | Performed by: INTERNAL MEDICINE

## 2023-11-08 PROCEDURE — 90471 IMMUNIZATION ADMIN: CPT | Performed by: INTERNAL MEDICINE

## 2023-11-08 RX ORDER — CARVEDILOL 6.25 MG/1
6.25 TABLET ORAL
Qty: 60 TABLET | Refills: 11 | Status: SHIPPED | OUTPATIENT
Start: 2023-11-08 | End: 2024-02-15

## 2023-11-08 NOTE — PROGRESS NOTES
Subjective   Patient ID: Yu Small is a 53 y.o. female who presents for Med Refill and Flu Vaccine (Questions reviewed and answered by pt).    Med Refill     Overall well  #1 hypertension-  at home Bps 130/70.   #2 diabetes- following w/ endo, a1c`8.3  #3 hyperlipidemia- liitle exercise.   #4 anemia/leukocytosis/thrombocytosis- no bleeding/bruising  #5 rt shoulder- f/u ortho, information provided for second opinions.  #6 leg cramps-stable.  #7 tachycardia- normal Holter monitor. Follow-up cardiology  #8 fatty liver- f/u GI. appt pending, s/p Bx.   #9 subclinical hypothyroidism- better on rx. retest 6 mths  #10 colon polyps. s/p colo (dr stout) 4/21--> f/u GI.       Review of Systems   All other systems reviewed and are negative.      Objective   /64 (BP Location: Left arm, Patient Position: Sitting, BP Cuff Size: Large adult)   Temp 36.6 °C (97.9 °F) (Temporal)   Wt 103 kg (226 lb)   BMI 36.48 kg/m²     Physical Exam  Constitutional:       Appearance: Normal appearance.   Cardiovascular:      Rate and Rhythm: Normal rate and regular rhythm.      Pulses: Normal pulses.      Heart sounds: Normal heart sounds. No murmur heard.  Pulmonary:      Effort: Pulmonary effort is normal. No respiratory distress.      Breath sounds: Normal breath sounds. No wheezing, rhonchi or rales.   Neurological:      Mental Status: She is alert.   Psychiatric:         Mood and Affect: Mood normal.         Behavior: Behavior normal.         Thought Content: Thought content normal.         Judgment: Judgment normal.     Lab Results   Component Value Date    WBC 11.2 04/22/2022    HGB 13.1 04/22/2022    HCT 40.7 04/22/2022     04/22/2022    CHOL 127 01/27/2023    TRIG 169 (H) 01/27/2023    HDL 30.4 (A) 01/27/2023    ALT 31 08/16/2023    AST 20 08/16/2023     08/16/2023    K 4.4 08/16/2023    CL 98 08/16/2023    CREATININE 0.81 08/16/2023    BUN 17 08/16/2023    CO2 27 08/16/2023    TSH 3.82 08/16/2023    INR 1.0  02/10/2021    HGBA1C 9.1 (A) 01/27/2023     A1c- (8/23/23) =6.6    Assessment/Plan   Problem List Items Addressed This Visit    None  Visit Diagnoses       Need for influenza vaccination                  #1 hypertension- chase target  #2 diabetes- follow-up endocrinology, appt pending.   #3 hyperlipidemia- on target   #4 anemia/leukocytosis/thrombocytosis- Follow-up with GI as well as hematology.   #5 rt shoulder- f/u ortho, information provided for second opinions.  #6 leg cramps-stable.  #7 tachycardia- normal Holter monitor. Follow-up cardiology  #8 fatty liver- f/u GI. appt pending, s/p Bx.   Appt pending 2/24  #9 subclinical hypothyroidism- better on rx. retest 6 mths  #10 colon polyps. s/p colo (dr stout) 4/21--> f/u GI, appt pending 2/24  #11 neuropathy- reviewed foot care.  f/u  w/ podiatry.  Gabapentin 100 mg po qhs     Increased BMI- discussed diet and exercise  Follow-up gynecology for Pap/mammogram  Recommend shingles vaccine

## 2023-12-04 ENCOUNTER — OFFICE VISIT (OUTPATIENT)
Dept: PODIATRY | Facility: CLINIC | Age: 53
End: 2023-12-04
Payer: COMMERCIAL

## 2023-12-04 VITALS — HEART RATE: 100 BPM | DIASTOLIC BLOOD PRESSURE: 66 MMHG | SYSTOLIC BLOOD PRESSURE: 130 MMHG | TEMPERATURE: 97.7 F

## 2023-12-04 DIAGNOSIS — M79.674 TOE PAIN, RIGHT: ICD-10-CM

## 2023-12-04 DIAGNOSIS — E11.9 DIABETES MELLITUS WITHOUT COMPLICATION (MULTI): ICD-10-CM

## 2023-12-04 DIAGNOSIS — M79.675 TOE PAIN, LEFT: ICD-10-CM

## 2023-12-04 DIAGNOSIS — B35.1 TINEA UNGUIUM: Primary | ICD-10-CM

## 2023-12-04 PROCEDURE — 11721 DEBRIDE NAIL 6 OR MORE: CPT | Performed by: PODIATRIST

## 2023-12-04 PROCEDURE — 3078F DIAST BP <80 MM HG: CPT | Performed by: PODIATRIST

## 2023-12-04 PROCEDURE — 3046F HEMOGLOBIN A1C LEVEL >9.0%: CPT | Performed by: PODIATRIST

## 2023-12-04 PROCEDURE — 3075F SYST BP GE 130 - 139MM HG: CPT | Performed by: PODIATRIST

## 2023-12-04 PROCEDURE — 1036F TOBACCO NON-USER: CPT | Performed by: PODIATRIST

## 2023-12-04 PROCEDURE — 4010F ACE/ARB THERAPY RXD/TAKEN: CPT | Performed by: PODIATRIST

## 2023-12-04 NOTE — PROGRESS NOTES
CC: painful thickened and elongated toenails and diabetic care.     HPI: Pt presents for dm foot exam and painful thickened and elongated toenails that are difficult to manage.  Onset was gradual with worsening course until recently.  Aggravated by shoe gear and ambulation.       PCP: Candido  Last visit: 9-1-23     PMH  Past Medical History:   Diagnosis Date    Anogenital (venereal) warts     Genital warts    Depression, unspecified 11/03/2013    Depression    Encounter for screening for malignant neoplasm of vagina     Vaginal Pap smear    Osteoarthritis of knee, unspecified 11/03/2013    Osteoarthritis of knee    Other conditions influencing health status     History of pregnancy     MEDS    Current Outpatient Medications:     amLODIPine (Norvasc) 10 mg tablet, Take 1 tablet (10 mg) by mouth once daily., Disp: 30 tablet, Rfl: 11    aspirin 81 mg chewable tablet, Chew once daily., Disp: , Rfl:     Basaglar KwikPen U-100 Insulin 100 unit/mL (3 mL) pen, inject 65 units Subcutaneously once a day, Disp: , Rfl:     carvedilol (Coreg) 6.25 mg tablet, Take 1 tablet (6.25 mg) by mouth 2 times a day with meals., Disp: 60 tablet, Rfl: 11    coenzyme Q-10 100 mg capsule, Take 1 capsule (100 mg) by mouth once daily., Disp: , Rfl:     gabapentin (Neurontin) 100 mg capsule, Take 1 capsule (100 mg) by mouth once daily at bedtime., Disp: 90 capsule, Rfl: 1    glimepiride (Amaryl) 4 mg tablet, TAKE 1 TABLET BY MOUTH EVERY DAY with breakfast or the first main meal OF the day 30, Disp: , Rfl:     hydroCHLOROthiazide (HYDRODiuril) 25 mg tablet, Take 1 tablet (25 mg) by mouth once daily., Disp: 90 tablet, Rfl: 3    levothyroxine (Synthroid, Levoxyl) 25 mcg tablet, Take 1 tablet (25 mcg) by mouth once daily., Disp: 30 tablet, Rfl: 11    lisinopril 40 mg tablet, Take 1 tablet (40 mg) by mouth once daily., Disp: 30 tablet, Rfl: 11    metFORMIN (Glucophage) 1,000 mg tablet, Take 1 tablet (1,000 mg) by mouth 2 times a day with meals., Disp:  "180 tablet, Rfl: 3    multivitamin tablet, Take 1 tablet by mouth once daily., Disp: , Rfl:     pantoprazole (ProtoNix) 40 mg EC tablet, Take 1 tablet (40 mg) by mouth once daily., Disp: , Rfl:     semaglutide (Ozempic) 1 mg/dose (2 mg/1.5 mL) pen injector, 1 (one) time per week., Disp: , Rfl:     simvastatin (Zocor) 40 mg tablet, Take 1 tablet (40 mg) by mouth once daily., Disp: 90 tablet, Rfl: 3    vitamin E 180 mg (400 unit) capsule, Take 1 capsule (400 Units) by mouth once daily in the morning., Disp: , Rfl:     Accu-Chek Guide test strips strip, 2 times a day., Disp: , Rfl:     lancets 30 gauge misc, 2 times a day., Disp: , Rfl:     pen needle, diabetic 32 gauge x 5/32\" needle, 2 times a day., Disp: , Rfl:     polyethylene glycol (Glycolax) 17 gram/dose powder, Take by mouth., Disp: , Rfl:   Allergies  Allergies   Allergen Reactions    Chlorthalidone Headache    Hydrocodone Unknown    Hydrocodone-Acetaminophen Itching    Levallorphan Unknown    Oxycodone Itching    Oxycodone-Acetaminophen Itching    Sulfamethoxazole Unknown    Trimethoprim Unknown    Hydromorphone Rash    Other Rash    Sulfamethoxazole-Trimethoprim Rash     Social History     Socioeconomic History    Marital status:      Spouse name: None    Number of children: None    Years of education: None    Highest education level: None   Occupational History    None   Tobacco Use    Smoking status: Never     Passive exposure: Never    Smokeless tobacco: Never   Substance and Sexual Activity    Alcohol use: Yes     Comment: rare    Drug use: Never    Sexual activity: None   Other Topics Concern    None   Social History Narrative    None     Social Determinants of Health     Financial Resource Strain: Not on file   Food Insecurity: Not on file   Transportation Needs: Not on file   Physical Activity: Not on file   Stress: Not on file   Social Connections: Not on file   Intimate Partner Violence: Not on file   Housing Stability: Not on file     Family " History   Problem Relation Name Age of Onset    Breast cancer Mother      Hypertension Father      Leukemia Father      Basal cell carcinoma Father       Past Surgical History:   Procedure Laterality Date    BREAST SURGERY  04/03/2014    Breast Surgery Reduction Procedure    HYSTEROSCOPY  02/19/2017    Hysteroscopy With Endometrial Ablation    OTHER SURGICAL HISTORY  11/19/2020    Shoulder surgery    OTHER SURGICAL HISTORY  04/03/2014    Oral Surgery       REVIEW OF SYSTEMS  DERM:   + as noted in HPI.       Physical examination:   On General Observation: Patient is a pleasant, cooperative, well developed 53 y.o. diabetic   adult female. The patient is alert and oriented to time, place and person. Patient has normal affect and mood.  /66   Pulse 100   Temp 36.5 °C (97.7 °F)     Vascular:  DP and PT pulses are 2/4 b/l.  mild edema noted. mild varicosities b/l.  CFT  5 seconds to all digits bilateral.  Skin temperature is warm to warm from proximal to distal bilateral.      Muscular: Strength is 5/5 for all instrinsic and extrinsic muscle groups.     Neuro:  Proprioception present.   Sensation to vibration is  present. Protective sensation intact  at all pedal sites via [place] Sam 5.07 monofilament bilateral.  Light touch intact bilateral.     Derm:    Left toenails: 1-5 Brittleness, crumbling upon debridement, subungual debris, elongation, mycotic appearance, tenderness, and thickness.   Right toenails: 1-5 Brittleness, crumbling upon debridement, subungual debris, elongation, mycotic appearance, tenderness, and thickness.   Hair growth is decreased b/l le    ASSESSMENT:    Tinea Unguium [B35.1]   E11.9  Pain in right toe(s) [M79.674]   Pain in left toe(s) [M79.675]       PLAN:   Dm foot exam n/c    - Debrided toenails 1-10 in length and height.   - Follow up in 9-12 weeks.       Deonte Wright DPM

## 2024-02-13 DIAGNOSIS — E11.9 TYPE 2 DIABETES MELLITUS WITHOUT COMPLICATION, WITHOUT LONG-TERM CURRENT USE OF INSULIN (MULTI): ICD-10-CM

## 2024-02-13 DIAGNOSIS — E03.8 OTHER SPECIFIED HYPOTHYROIDISM: ICD-10-CM

## 2024-02-13 DIAGNOSIS — E78.5 HYPERLIPIDEMIA, UNSPECIFIED HYPERLIPIDEMIA TYPE: ICD-10-CM

## 2024-02-13 DIAGNOSIS — I10 BENIGN ESSENTIAL HYPERTENSION: ICD-10-CM

## 2024-02-13 DIAGNOSIS — I10 ESSENTIAL (PRIMARY) HYPERTENSION: ICD-10-CM

## 2024-02-15 RX ORDER — AMLODIPINE BESYLATE 10 MG/1
10 TABLET ORAL DAILY
Qty: 90 TABLET | Refills: 3 | Status: SHIPPED | OUTPATIENT
Start: 2024-02-15 | End: 2025-02-14

## 2024-02-15 RX ORDER — HYDROCHLOROTHIAZIDE 25 MG/1
25 TABLET ORAL DAILY
Qty: 90 TABLET | Refills: 3 | Status: SHIPPED | OUTPATIENT
Start: 2024-02-15 | End: 2025-02-14

## 2024-02-15 RX ORDER — CARVEDILOL 6.25 MG/1
6.25 TABLET ORAL
Qty: 180 TABLET | Refills: 3 | Status: SHIPPED | OUTPATIENT
Start: 2024-02-15 | End: 2025-02-14

## 2024-02-15 RX ORDER — LISINOPRIL 40 MG/1
40 TABLET ORAL DAILY
Qty: 90 TABLET | Refills: 3 | Status: SHIPPED | OUTPATIENT
Start: 2024-02-15 | End: 2025-02-14

## 2024-02-15 RX ORDER — LEVOTHYROXINE SODIUM 25 UG/1
25 TABLET ORAL
Qty: 90 TABLET | Refills: 3 | Status: SHIPPED | OUTPATIENT
Start: 2024-02-15 | End: 2025-02-14

## 2024-02-15 RX ORDER — SIMVASTATIN 40 MG/1
40 TABLET, FILM COATED ORAL NIGHTLY
Qty: 90 TABLET | Refills: 3 | Status: SHIPPED | OUTPATIENT
Start: 2024-02-15 | End: 2024-05-15 | Stop reason: WASHOUT

## 2024-02-15 RX ORDER — METFORMIN HYDROCHLORIDE 1000 MG/1
1000 TABLET ORAL
Qty: 180 TABLET | Refills: 3 | Status: SHIPPED | OUTPATIENT
Start: 2024-02-15 | End: 2025-02-14

## 2024-02-23 ENCOUNTER — TELEPHONE (OUTPATIENT)
Dept: PRIMARY CARE | Facility: CLINIC | Age: 54
End: 2024-02-23
Payer: COMMERCIAL

## 2024-02-23 DIAGNOSIS — E78.5 HYPERLIPIDEMIA, UNSPECIFIED HYPERLIPIDEMIA TYPE: ICD-10-CM

## 2024-02-23 RX ORDER — ATORVASTATIN CALCIUM 20 MG/1
20 TABLET, FILM COATED ORAL DAILY
Qty: 90 TABLET | Refills: 2 | Status: SHIPPED | OUTPATIENT
Start: 2024-02-23

## 2024-02-23 RX ORDER — ATORVASTATIN CALCIUM 20 MG/1
20 TABLET, FILM COATED ORAL DAILY
COMMUNITY
Start: 2024-02-23 | End: 2024-02-23 | Stop reason: SDUPTHER

## 2024-02-23 NOTE — TELEPHONE ENCOUNTER
I spoke with the pt and the pharmacy, [facility], and relayed the msg with understanding.  Can you enter the new script for Dr. Rey to approve and sent to [facility].  TY.

## 2024-02-23 NOTE — TELEPHONE ENCOUNTER
Pt left a msg stating that she received a letter from her insurance that said we needed to speak to [facility].  I called and spoke with the pharmacist regarding her Simvaastatin.  He said that there is a drug interaction between her Amlodipine and the Simvastatin.  The Simvastatin  recommends that if the pt takes both of the afore mention meds, that the recommended dose of Simvastatin should be 20 mg, as the Amlodipine increases it's effectiveness.  He did say that some pt's need the 40 mg of Simvastatin.  Please advise.  [facility] ph:  [phone]  ref#: [id number]

## 2024-03-04 ENCOUNTER — OFFICE VISIT (OUTPATIENT)
Dept: PODIATRY | Facility: CLINIC | Age: 54
End: 2024-03-04
Payer: COMMERCIAL

## 2024-03-04 VITALS — DIASTOLIC BLOOD PRESSURE: 56 MMHG | SYSTOLIC BLOOD PRESSURE: 119 MMHG | HEART RATE: 90 BPM | TEMPERATURE: 97.8 F

## 2024-03-04 DIAGNOSIS — E11.9 DIABETES MELLITUS WITHOUT COMPLICATION (MULTI): ICD-10-CM

## 2024-03-04 DIAGNOSIS — B35.1 TINEA UNGUIUM: ICD-10-CM

## 2024-03-04 PROCEDURE — 3078F DIAST BP <80 MM HG: CPT | Performed by: PODIATRIST

## 2024-03-04 PROCEDURE — 3074F SYST BP LT 130 MM HG: CPT | Performed by: PODIATRIST

## 2024-03-04 PROCEDURE — 4010F ACE/ARB THERAPY RXD/TAKEN: CPT | Performed by: PODIATRIST

## 2024-03-04 PROCEDURE — 1036F TOBACCO NON-USER: CPT | Performed by: PODIATRIST

## 2024-03-04 PROCEDURE — 99212 OFFICE O/P EST SF 10 MIN: CPT | Performed by: PODIATRIST

## 2024-03-04 RX ORDER — CHOLECALCIFEROL (VITAMIN D3) 25 MCG
1000 TABLET ORAL DAILY
COMMUNITY

## 2024-03-04 NOTE — PROGRESS NOTES
CC: painful thickened and elongated toenails and diabetic care.     HPI: Pt presents for dm foot exam, no acute issues, not checking the sugars.    PCP: Dr. Rey  Last visit: 11-8-23     PMH  Past Medical History:   Diagnosis Date    Anogenital (venereal) warts     Genital warts    Depression, unspecified 11/03/2013    Depression    Encounter for screening for malignant neoplasm of vagina     Vaginal Pap smear    Osteoarthritis of knee, unspecified 11/03/2013    Osteoarthritis of knee    Other conditions influencing health status     History of pregnancy     MEDS    Current Outpatient Medications:     amLODIPine (Norvasc) 10 mg tablet, Take 1 tablet (10 mg) by mouth once daily., Disp: 90 tablet, Rfl: 3    aspirin 81 mg chewable tablet, Chew once daily., Disp: , Rfl:     atorvastatin (Lipitor) 20 mg tablet, Take 1 tablet (20 mg) by mouth once daily., Disp: 90 tablet, Rfl: 2    Basaglar KwikPen U-100 Insulin 100 unit/mL (3 mL) pen, inject 65 units Subcutaneously once a day, Disp: , Rfl:     carvedilol (Coreg) 6.25 mg tablet, Take 1 tablet (6.25 mg) by mouth 2 times a day with meals., Disp: 180 tablet, Rfl: 3    coenzyme Q-10 100 mg capsule, Take 1 capsule (100 mg) by mouth once daily., Disp: , Rfl:     glimepiride (Amaryl) 4 mg tablet, TAKE 1 TABLET BY MOUTH EVERY DAY with breakfast or the first main meal OF the day 30, Disp: , Rfl:     hydroCHLOROthiazide (HYDRODiuril) 25 mg tablet, Take 1 tablet (25 mg) by mouth once daily., Disp: 90 tablet, Rfl: 3    levothyroxine (Synthroid, Levoxyl) 25 mcg tablet, Take 1 tablet (25 mcg) by mouth once daily in the morning. Take before meals., Disp: 90 tablet, Rfl: 3    lisinopril 40 mg tablet, Take 1 tablet (40 mg) by mouth once daily., Disp: 90 tablet, Rfl: 3    metFORMIN (Glucophage) 1,000 mg tablet, Take 1 tablet (1,000 mg) by mouth 2 times a day with meals., Disp: 180 tablet, Rfl: 3    multivitamin tablet, Take 1 tablet by mouth once daily., Disp: , Rfl:     pantoprazole  "(ProtoNix) 40 mg EC tablet, Take 1 tablet (40 mg) by mouth once daily., Disp: , Rfl:     semaglutide (Ozempic) 1 mg/dose (2 mg/1.5 mL) pen injector, 1 (one) time per week., Disp: , Rfl:     vitamin E 180 mg (400 unit) capsule, Take 1 capsule (400 Units) by mouth once daily in the morning., Disp: , Rfl:     Accu-Chek Guide test strips strip, 2 times a day., Disp: , Rfl:     gabapentin (Neurontin) 100 mg capsule, Take 1 capsule (100 mg) by mouth once daily at bedtime., Disp: 90 capsule, Rfl: 1    lancets 30 gauge misc, 2 times a day., Disp: , Rfl:     pen needle, diabetic 32 gauge x 5/32\" needle, 2 times a day., Disp: , Rfl:     polyethylene glycol (Glycolax) 17 gram/dose powder, Take by mouth., Disp: , Rfl:     simvastatin (Zocor) 40 mg tablet, Take 1 tablet (40 mg) by mouth once daily at bedtime., Disp: 90 tablet, Rfl: 3  Allergies  Allergies   Allergen Reactions    Chlorthalidone Headache    Hydrocodone Unknown    Hydrocodone-Acetaminophen Itching    Levallorphan Unknown    Oxycodone Itching    Oxycodone-Acetaminophen Itching    Sulfamethoxazole Unknown    Trimethoprim Unknown    Hydromorphone Rash    Other Rash    Sulfamethoxazole-Trimethoprim Rash     Social History     Socioeconomic History    Marital status:      Spouse name: None    Number of children: None    Years of education: None    Highest education level: None   Occupational History    None   Tobacco Use    Smoking status: Never     Passive exposure: Never    Smokeless tobacco: Never   Substance and Sexual Activity    Alcohol use: Yes     Comment: rare    Drug use: Never    Sexual activity: None   Other Topics Concern    None   Social History Narrative    None     Social Determinants of Health     Financial Resource Strain: Not on file   Food Insecurity: Not on file   Transportation Needs: Not on file   Physical Activity: Not on file   Stress: Not on file   Social Connections: Not on file   Intimate Partner Violence: Not on file   Housing " Stability: Not on file     Family History   Problem Relation Name Age of Onset    Breast cancer Mother      Hypertension Father      Leukemia Father      Basal cell carcinoma Father       Past Surgical History:   Procedure Laterality Date    BREAST SURGERY  04/03/2014    Breast Surgery Reduction Procedure    HYSTEROSCOPY  02/19/2017    Hysteroscopy With Endometrial Ablation    OTHER SURGICAL HISTORY  11/19/2020    Shoulder surgery    OTHER SURGICAL HISTORY  04/03/2014    Oral Surgery       REVIEW OF SYSTEMS  DERM:   + as noted in HPI.       Physical examination:   On General Observation: Patient is a pleasant, cooperative, well developed 53 y.o. diabetic   adult female. The patient is alert and oriented to time, place and person. Patient has normal affect and mood.  Temp 36.6 °C (97.8 °F)     Vascular:  DP and PT pulses are 2/4 b/l.  mild edema noted. mild varicosities b/l.  CFT  6 seconds to all digits bilateral.  Skin temperature is warm to warm from proximal to distal bilateral.      Muscular: Strength is 5/5 for all instrinsic and extrinsic muscle groups.     Neuro:  Proprioception present.   Sensation to vibration is  present. Protective sensation present  at all pedal sites via [place] 5.07 monofilament bilateral.  Light touch present bilateral.     Derm:    Left toenails: 1-5 Brittleness, crumbling upon debridement, subungual debris, elongation, mycotic appearance, tenderness, and thickness.   Right toenails: 1-5 Brittleness, crumbling upon debridement, subungual debris, elongation, mycotic appearance, tenderness, and thickness.   Hair growth is decreased b/l le    ASSESSMENT:    Tinea Unguium [B35.1]   E11.9  Pain in right toe(s) [M79.674]   Pain in left toe(s) [M79.675]       PLAN:   Exam  DM foot care and DM foot manifestations were reviewed.  The patient was educated on clinical findings, diagnosis and treatment plans. Patient understands all that has been explained and all questions were  answered to apparent satisfaction.     - Follow up in 9-12 weeks.       Deonte Wright DPM

## 2024-05-08 ENCOUNTER — APPOINTMENT (OUTPATIENT)
Dept: PRIMARY CARE | Facility: CLINIC | Age: 54
End: 2024-05-08
Payer: COMMERCIAL

## 2024-05-10 ENCOUNTER — LAB (OUTPATIENT)
Dept: LAB | Facility: LAB | Age: 54
End: 2024-05-10
Payer: COMMERCIAL

## 2024-05-10 DIAGNOSIS — Z79.4 LONG TERM (CURRENT) USE OF INSULIN (MULTI): ICD-10-CM

## 2024-05-10 DIAGNOSIS — E03.9 HYPOTHYROIDISM, UNSPECIFIED: ICD-10-CM

## 2024-05-10 DIAGNOSIS — E11.65 TYPE 2 DIABETES MELLITUS WITH HYPERGLYCEMIA (MULTI): Primary | ICD-10-CM

## 2024-05-10 DIAGNOSIS — E66.9 OBESITY, UNSPECIFIED: ICD-10-CM

## 2024-05-10 DIAGNOSIS — I10 ESSENTIAL (PRIMARY) HYPERTENSION: ICD-10-CM

## 2024-05-10 DIAGNOSIS — E78.5 HYPERLIPIDEMIA, UNSPECIFIED: ICD-10-CM

## 2024-05-10 DIAGNOSIS — K76.0 FATTY (CHANGE OF) LIVER, NOT ELSEWHERE CLASSIFIED: ICD-10-CM

## 2024-05-10 DIAGNOSIS — G62.9 POLYNEUROPATHY, UNSPECIFIED: ICD-10-CM

## 2024-05-10 LAB
ALBUMIN SERPL BCP-MCNC: 4.4 G/DL (ref 3.4–5)
ALP SERPL-CCNC: 85 U/L (ref 33–110)
ALT SERPL W P-5'-P-CCNC: 54 U/L (ref 7–45)
ANION GAP SERPL CALC-SCNC: 12 MMOL/L (ref 10–20)
AST SERPL W P-5'-P-CCNC: 31 U/L (ref 9–39)
BILIRUB SERPL-MCNC: 0.3 MG/DL (ref 0–1.2)
BUN SERPL-MCNC: 16 MG/DL (ref 6–23)
CALCIUM SERPL-MCNC: 9.4 MG/DL (ref 8.6–10.3)
CHLORIDE SERPL-SCNC: 100 MMOL/L (ref 98–107)
CHOLEST SERPL-MCNC: 148 MG/DL (ref 0–199)
CHOLESTEROL/HDL RATIO: 4.2
CO2 SERPL-SCNC: 29 MMOL/L (ref 21–32)
CREAT SERPL-MCNC: 0.74 MG/DL (ref 0.5–1.05)
CREAT UR-MCNC: 154.3 MG/DL (ref 20–320)
EGFRCR SERPLBLD CKD-EPI 2021: >90 ML/MIN/1.73M*2
GLUCOSE SERPL-MCNC: 121 MG/DL (ref 74–99)
HDLC SERPL-MCNC: 35.6 MG/DL
LDLC SERPL CALC-MCNC: 78 MG/DL
MICROALBUMIN UR-MCNC: 42.1 MG/L
MICROALBUMIN/CREAT UR: 27.3 UG/MG CREAT
NON HDL CHOLESTEROL: 112 MG/DL (ref 0–149)
POTASSIUM SERPL-SCNC: 4 MMOL/L (ref 3.5–5.3)
PROT SERPL-MCNC: 7.5 G/DL (ref 6.4–8.2)
SODIUM SERPL-SCNC: 137 MMOL/L (ref 136–145)
TRIGL SERPL-MCNC: 172 MG/DL (ref 0–149)
VLDL: 34 MG/DL (ref 0–40)

## 2024-05-10 PROCEDURE — 82570 ASSAY OF URINE CREATININE: CPT

## 2024-05-10 PROCEDURE — 80061 LIPID PANEL: CPT

## 2024-05-10 PROCEDURE — 36415 COLL VENOUS BLD VENIPUNCTURE: CPT

## 2024-05-10 PROCEDURE — 82043 UR ALBUMIN QUANTITATIVE: CPT

## 2024-05-10 PROCEDURE — 80053 COMPREHEN METABOLIC PANEL: CPT

## 2024-05-10 PROCEDURE — 83036 HEMOGLOBIN GLYCOSYLATED A1C: CPT

## 2024-05-11 LAB
EST. AVERAGE GLUCOSE BLD GHB EST-MCNC: 189 MG/DL
HBA1C MFR BLD: 8.2 %

## 2024-05-15 ENCOUNTER — OFFICE VISIT (OUTPATIENT)
Dept: PRIMARY CARE | Facility: CLINIC | Age: 54
End: 2024-05-15
Payer: COMMERCIAL

## 2024-05-15 VITALS
DIASTOLIC BLOOD PRESSURE: 74 MMHG | SYSTOLIC BLOOD PRESSURE: 130 MMHG | HEIGHT: 66 IN | WEIGHT: 230 LBS | TEMPERATURE: 97.6 F | BODY MASS INDEX: 36.96 KG/M2

## 2024-05-15 DIAGNOSIS — E78.5 HYPERLIPIDEMIA, UNSPECIFIED HYPERLIPIDEMIA TYPE: ICD-10-CM

## 2024-05-15 DIAGNOSIS — F32.A DEPRESSIVE DISORDER: ICD-10-CM

## 2024-05-15 DIAGNOSIS — K76.0 FATTY LIVER: ICD-10-CM

## 2024-05-15 DIAGNOSIS — E03.8 SUBCLINICAL HYPOTHYROIDISM: Primary | ICD-10-CM

## 2024-05-15 DIAGNOSIS — R74.8 ABNORMAL AST AND ALT: ICD-10-CM

## 2024-05-15 DIAGNOSIS — D64.9 ANEMIA, UNSPECIFIED TYPE: ICD-10-CM

## 2024-05-15 DIAGNOSIS — I10 BENIGN ESSENTIAL HYPERTENSION: ICD-10-CM

## 2024-05-15 PROCEDURE — 3008F BODY MASS INDEX DOCD: CPT | Performed by: INTERNAL MEDICINE

## 2024-05-15 PROCEDURE — 3048F LDL-C <100 MG/DL: CPT | Performed by: INTERNAL MEDICINE

## 2024-05-15 PROCEDURE — 3052F HG A1C>EQUAL 8.0%<EQUAL 9.0%: CPT | Performed by: INTERNAL MEDICINE

## 2024-05-15 PROCEDURE — 3075F SYST BP GE 130 - 139MM HG: CPT | Performed by: INTERNAL MEDICINE

## 2024-05-15 PROCEDURE — 4010F ACE/ARB THERAPY RXD/TAKEN: CPT | Performed by: INTERNAL MEDICINE

## 2024-05-15 PROCEDURE — 3078F DIAST BP <80 MM HG: CPT | Performed by: INTERNAL MEDICINE

## 2024-05-15 PROCEDURE — 99214 OFFICE O/P EST MOD 30 MIN: CPT | Performed by: INTERNAL MEDICINE

## 2024-05-15 PROCEDURE — 1036F TOBACCO NON-USER: CPT | Performed by: INTERNAL MEDICINE

## 2024-05-15 PROCEDURE — 3060F POS MICROALBUMINURIA REV: CPT | Performed by: INTERNAL MEDICINE

## 2024-05-15 RX ORDER — INSULIN DEGLUDEC 200 U/ML
70 INJECTION, SOLUTION SUBCUTANEOUS NIGHTLY
COMMUNITY

## 2024-05-15 NOTE — PROGRESS NOTES
"Subjective   Patient ID: Yu Small is a 53 y.o. female who presents for Follow-up (6 months).    Med Refill     Overall well  #1 hypertension-  at home Bps 130/70.   #2 diabetes- following w/ endo, A1c=8.2. on increased insulin.  #3 hyperlipidemia- liitle exercise.   #4 anemia/leukocytosis/thrombocytosis- no bleeding/bruising  #5 rt shoulder- f/u ortho, information provided for second opinions.  #6 leg cramps-stable.  #7 tachycardia- normal Holter monitor. Follow-up cardiology  #8 fatty liver- f/u GI. appt pending today, s/p Bx.   #9 subclinical hypothyroidism- better on rx. retest 6 mths  #10 colon polyps. s/p colo (dr stout) 4/21--> f/u GI.       Review of Systems   All other systems reviewed and are negative.      Objective   /74 (BP Location: Left arm, Patient Position: Sitting, BP Cuff Size: Large adult)   Temp 36.4 °C (97.6 °F)   Ht 1.676 m (5' 6\")   Wt 104 kg (230 lb)   BMI 37.12 kg/m²     Physical Exam  Constitutional:       Appearance: Normal appearance.   Cardiovascular:      Rate and Rhythm: Normal rate and regular rhythm.      Pulses: Normal pulses.      Heart sounds: Normal heart sounds. No murmur heard.  Pulmonary:      Effort: Pulmonary effort is normal. No respiratory distress.      Breath sounds: Normal breath sounds. No wheezing, rhonchi or rales.   Neurological:      Mental Status: She is alert.   Psychiatric:         Mood and Affect: Mood normal.         Behavior: Behavior normal.         Thought Content: Thought content normal.         Judgment: Judgment normal.     Lab Results   Component Value Date    WBC 11.2 04/22/2022    HGB 13.1 04/22/2022    HCT 40.7 04/22/2022     04/22/2022    CHOL 148 05/10/2024    TRIG 172 (H) 05/10/2024    HDL 35.6 05/10/2024    ALT 54 (H) 05/10/2024    AST 31 05/10/2024     05/10/2024    K 4.0 05/10/2024     05/10/2024    CREATININE 0.74 05/10/2024    BUN 16 05/10/2024    CO2 29 05/10/2024    TSH 3.82 08/16/2023    INR 1.0 " 02/10/2021    HGBA1C 8.2 (H) 05/10/2024     A1c- (8/23/23) =6.6    Assessment/Plan     #1 hypertension- on target  #2 diabetes- follow-up endocrinology, appt pending.   #3 hyperlipidemia- on target   #4 anemia/leukocytosis/thrombocytosis- Follow-up with GI as well as hematology.   #5 rt shoulder- good.  f/u ortho  #6 leg cramps-stable.  #7 tachycardia- normal Holter monitor. Follow-up cardiology  #8 fatty liver- f/u GI. appt pending, s/p Bx.   Appt pending 2/24  #9 subclinical hypothyroidism- better on rx. retest 6 mths  #10 colon polyps. s/p colo (dr stout) 4/21--> f/u GI, appt pending today  #11 neuropathy- reviewed foot care.  f/u  w/ podiatry.  Gabapentin 100 mg po qhs     Increased BMI- discussed diet and exercise  Follow-up gynecology for Pap/mammogram  Recommend shingles vaccine

## 2024-05-24 ENCOUNTER — LAB (OUTPATIENT)
Dept: LAB | Facility: LAB | Age: 54
End: 2024-05-24
Payer: COMMERCIAL

## 2024-05-24 DIAGNOSIS — I10 BENIGN ESSENTIAL HYPERTENSION: ICD-10-CM

## 2024-05-24 DIAGNOSIS — D50.0 IRON DEFICIENCY ANEMIA SECONDARY TO BLOOD LOSS (CHRONIC): Primary | ICD-10-CM

## 2024-05-24 DIAGNOSIS — E03.8 SUBCLINICAL HYPOTHYROIDISM: ICD-10-CM

## 2024-05-24 DIAGNOSIS — D64.9 ANEMIA, UNSPECIFIED TYPE: ICD-10-CM

## 2024-05-24 LAB
BASOPHILS # BLD AUTO: 0.07 X10*3/UL (ref 0–0.1)
BASOPHILS NFR BLD AUTO: 0.7 %
EOSINOPHIL # BLD AUTO: 0.8 X10*3/UL (ref 0–0.7)
EOSINOPHIL NFR BLD AUTO: 7.8 %
ERYTHROCYTE [DISTWIDTH] IN BLOOD BY AUTOMATED COUNT: 13.3 % (ref 11.5–14.5)
FERRITIN SERPL-MCNC: 99 NG/ML (ref 8–150)
HCT VFR BLD AUTO: 40 % (ref 36–46)
HGB BLD-MCNC: 12.9 G/DL (ref 12–16)
IMM GRANULOCYTES # BLD AUTO: 0.05 X10*3/UL (ref 0–0.7)
IMM GRANULOCYTES NFR BLD AUTO: 0.5 % (ref 0–0.9)
IRON SATN MFR SERPL: 18 % (ref 25–45)
IRON SERPL-MCNC: 66 UG/DL (ref 35–150)
LYMPHOCYTES # BLD AUTO: 2.53 X10*3/UL (ref 1.2–4.8)
LYMPHOCYTES NFR BLD AUTO: 24.8 %
MCH RBC QN AUTO: 27.6 PG (ref 26–34)
MCHC RBC AUTO-ENTMCNC: 32.3 G/DL (ref 32–36)
MCV RBC AUTO: 86 FL (ref 80–100)
MONOCYTES # BLD AUTO: 0.71 X10*3/UL (ref 0.1–1)
MONOCYTES NFR BLD AUTO: 7 %
NEUTROPHILS # BLD AUTO: 6.05 X10*3/UL (ref 1.2–7.7)
NEUTROPHILS NFR BLD AUTO: 59.2 %
NRBC BLD-RTO: 0 /100 WBCS (ref 0–0)
PLATELET # BLD AUTO: 450 X10*3/UL (ref 150–450)
RBC # BLD AUTO: 4.68 X10*6/UL (ref 4–5.2)
TIBC SERPL-MCNC: 369 UG/DL (ref 240–445)
TSH SERPL-ACNC: 3.57 MIU/L (ref 0.44–3.98)
UIBC SERPL-MCNC: 303 UG/DL (ref 110–370)
WBC # BLD AUTO: 10.2 X10*3/UL (ref 4.4–11.3)

## 2024-05-24 PROCEDURE — 85025 COMPLETE CBC W/AUTO DIFF WBC: CPT

## 2024-05-24 PROCEDURE — 84443 ASSAY THYROID STIM HORMONE: CPT

## 2024-05-24 PROCEDURE — 82728 ASSAY OF FERRITIN: CPT

## 2024-05-24 PROCEDURE — 83550 IRON BINDING TEST: CPT

## 2024-05-24 PROCEDURE — 36415 COLL VENOUS BLD VENIPUNCTURE: CPT

## 2024-05-24 PROCEDURE — 83540 ASSAY OF IRON: CPT

## 2024-06-03 ENCOUNTER — OFFICE VISIT (OUTPATIENT)
Dept: PODIATRY | Facility: CLINIC | Age: 54
End: 2024-06-03
Payer: COMMERCIAL

## 2024-06-03 VITALS — DIASTOLIC BLOOD PRESSURE: 57 MMHG | SYSTOLIC BLOOD PRESSURE: 138 MMHG | TEMPERATURE: 97.7 F | HEART RATE: 97 BPM

## 2024-06-03 DIAGNOSIS — M79.675 TOE PAIN, LEFT: ICD-10-CM

## 2024-06-03 DIAGNOSIS — M79.674 TOE PAIN, RIGHT: ICD-10-CM

## 2024-06-03 DIAGNOSIS — E11.65 POORLY CONTROLLED DIABETES MELLITUS (MULTI): Primary | ICD-10-CM

## 2024-06-03 PROCEDURE — 3052F HG A1C>EQUAL 8.0%<EQUAL 9.0%: CPT | Performed by: PODIATRIST

## 2024-06-03 PROCEDURE — 1036F TOBACCO NON-USER: CPT | Performed by: PODIATRIST

## 2024-06-03 PROCEDURE — 3048F LDL-C <100 MG/DL: CPT | Performed by: PODIATRIST

## 2024-06-03 PROCEDURE — 3078F DIAST BP <80 MM HG: CPT | Performed by: PODIATRIST

## 2024-06-03 PROCEDURE — 3008F BODY MASS INDEX DOCD: CPT | Performed by: PODIATRIST

## 2024-06-03 PROCEDURE — 4010F ACE/ARB THERAPY RXD/TAKEN: CPT | Performed by: PODIATRIST

## 2024-06-03 PROCEDURE — 99212 OFFICE O/P EST SF 10 MIN: CPT | Performed by: PODIATRIST

## 2024-06-03 PROCEDURE — 3060F POS MICROALBUMINURIA REV: CPT | Performed by: PODIATRIST

## 2024-06-03 PROCEDURE — 3075F SYST BP GE 130 - 139MM HG: CPT | Performed by: PODIATRIST

## 2024-06-03 NOTE — PROGRESS NOTES
CC: diabetic foot exam     HPI: Pt presents for dm foot exam, no acute issues, not checking the sugars. AIC is 8.0, see's endo.     PCP: Dr. Rey  Last visit: 5-124     PMH  Medical History        Past Medical History:   Diagnosis Date    Anogenital (venereal) warts       Genital warts    Depression, unspecified 11/03/2013     Depression    Encounter for screening for malignant neoplasm of vagina       Vaginal Pap smear    Osteoarthritis of knee, unspecified 11/03/2013     Osteoarthritis of knee    Other conditions influencing health status       History of pregnancy         MEDS     Current Outpatient Medications:     amLODIPine (Norvasc) 10 mg tablet, Take 1 tablet (10 mg) by mouth once daily., Disp: 90 tablet, Rfl: 3    aspirin 81 mg chewable tablet, Chew once daily., Disp: , Rfl:     atorvastatin (Lipitor) 20 mg tablet, Take 1 tablet (20 mg) by mouth once daily., Disp: 90 tablet, Rfl: 2    Basaglar KwikPen U-100 Insulin 100 unit/mL (3 mL) pen, inject 65 units Subcutaneously once a day, Disp: , Rfl:     carvedilol (Coreg) 6.25 mg tablet, Take 1 tablet (6.25 mg) by mouth 2 times a day with meals., Disp: 180 tablet, Rfl: 3    coenzyme Q-10 100 mg capsule, Take 1 capsule (100 mg) by mouth once daily., Disp: , Rfl:     glimepiride (Amaryl) 4 mg tablet, TAKE 1 TABLET BY MOUTH EVERY DAY with breakfast or the first main meal OF the day 30, Disp: , Rfl:     hydroCHLOROthiazide (HYDRODiuril) 25 mg tablet, Take 1 tablet (25 mg) by mouth once daily., Disp: 90 tablet, Rfl: 3    levothyroxine (Synthroid, Levoxyl) 25 mcg tablet, Take 1 tablet (25 mcg) by mouth once daily in the morning. Take before meals., Disp: 90 tablet, Rfl: 3    lisinopril 40 mg tablet, Take 1 tablet (40 mg) by mouth once daily., Disp: 90 tablet, Rfl: 3    metFORMIN (Glucophage) 1,000 mg tablet, Take 1 tablet (1,000 mg) by mouth 2 times a day with meals., Disp: 180 tablet, Rfl: 3    multivitamin tablet, Take 1 tablet by mouth once daily., Disp: , Rfl:      "pantoprazole (ProtoNix) 40 mg EC tablet, Take 1 tablet (40 mg) by mouth once daily., Disp: , Rfl:     semaglutide (Ozempic) 1 mg/dose (2 mg/1.5 mL) pen injector, 1 (one) time per week., Disp: , Rfl:     vitamin E 180 mg (400 unit) capsule, Take 1 capsule (400 Units) by mouth once daily in the morning., Disp: , Rfl:     Accu-Chek Guide test strips strip, 2 times a day., Disp: , Rfl:     gabapentin (Neurontin) 100 mg capsule, Take 1 capsule (100 mg) by mouth once daily at bedtime., Disp: 90 capsule, Rfl: 1    lancets 30 gauge misc, 2 times a day., Disp: , Rfl:     pen needle, diabetic 32 gauge x 5/32\" needle, 2 times a day., Disp: , Rfl:     polyethylene glycol (Glycolax) 17 gram/dose powder, Take by mouth., Disp: , Rfl:     simvastatin (Zocor) 40 mg tablet, Take 1 tablet (40 mg) by mouth once daily at bedtime., Disp: 90 tablet, Rfl: 3  Allergies       Allergies   Allergen Reactions    Chlorthalidone Headache    Hydrocodone Unknown    Hydrocodone-Acetaminophen Itching    Levallorphan Unknown    Oxycodone Itching    Oxycodone-Acetaminophen Itching    Sulfamethoxazole Unknown    Trimethoprim Unknown    Hydromorphone Rash    Other Rash    Sulfamethoxazole-Trimethoprim Rash      Social History   Social History            Socioeconomic History    Marital status:        Spouse name: None    Number of children: None    Years of education: None    Highest education level: None   Occupational History    None   Tobacco Use    Smoking status: Never       Passive exposure: Never    Smokeless tobacco: Never   Substance and Sexual Activity    Alcohol use: Yes       Comment: rare    Drug use: Never    Sexual activity: None   Other Topics Concern    None   Social History Narrative    None      Social Determinants of Health      Financial Resource Strain: Not on file   Food Insecurity: Not on file   Transportation Needs: Not on file   Physical Activity: Not on file   Stress: Not on file   Social Connections: Not on file "   Intimate Partner Violence: Not on file   Housing Stability: Not on file         Family History          Family History   Problem Relation Name Age of Onset    Breast cancer Mother        Hypertension Father        Leukemia Father        Basal cell carcinoma Father             Surgical History         Past Surgical History:   Procedure Laterality Date    BREAST SURGERY   04/03/2014     Breast Surgery Reduction Procedure    HYSTEROSCOPY   02/19/2017     Hysteroscopy With Endometrial Ablation    OTHER SURGICAL HISTORY   11/19/2020     Shoulder surgery    OTHER SURGICAL HISTORY   04/03/2014     Oral Surgery            REVIEW OF SYSTEMS  DERM:   + as noted in HPI.         Physical examination:   On General Observation: Patient is a pleasant, cooperative, well developed 53 y.o. diabetic   adult female. The patient is alert and oriented to time, place and person. Patient has normal affect and mood.  Temp 36.6 °C (97.8 °F)      Vascular:  DP and PT pulses are 2/4 b/l.  mild edema noted. mild varicosities b/l.  CFT  6 seconds to all digits bilateral.  Skin temperature is warm to warm from proximal to distal bilateral.       Muscular: Strength is 5/5 for all instrinsic and extrinsic muscle groups.      Neuro:  Proprioception present.   Sensation to vibration is  present. Protective sensation present  at all pedal sites via [place] 5.07 monofilament bilateral.  Light touch present bilateral.      Derm:    Left toenails: 1-5 Brittleness, crumbling upon debridement, subungual debris, elongation, mycotic appearance, tenderness, and thickness.   Right toenails: 1-5 Brittleness, crumbling upon debridement, subungual debris, elongation, mycotic appearance, tenderness, and thickness.   Hair growth is decreased b/l le     ASSESSMENT:      E11.65  Pain in right toe(s) [M79.674]   Pain in left toe(s) [M79.675]         PLAN:   Exam  DM foot care and DM foot manifestations were reviewed.  The patient was educated on clinical  findings, diagnosis and treatment plans. Patient understands all that has been explained and all questions were answered to apparent satisfaction. Working to lower sugars.          Deonte Wright DPM

## 2024-09-16 ENCOUNTER — APPOINTMENT (OUTPATIENT)
Dept: PODIATRY | Facility: CLINIC | Age: 54
End: 2024-09-16
Payer: COMMERCIAL

## 2024-09-16 VITALS — DIASTOLIC BLOOD PRESSURE: 65 MMHG | SYSTOLIC BLOOD PRESSURE: 131 MMHG | TEMPERATURE: 98.1 F | HEART RATE: 99 BPM

## 2024-09-16 DIAGNOSIS — M79.675 TOE PAIN, LEFT: ICD-10-CM

## 2024-09-16 DIAGNOSIS — E11.65 POORLY CONTROLLED DIABETES MELLITUS (MULTI): Primary | ICD-10-CM

## 2024-09-16 DIAGNOSIS — M79.674 TOE PAIN, RIGHT: ICD-10-CM

## 2024-09-16 PROCEDURE — 3052F HG A1C>EQUAL 8.0%<EQUAL 9.0%: CPT | Performed by: PODIATRIST

## 2024-09-16 PROCEDURE — 3078F DIAST BP <80 MM HG: CPT | Performed by: PODIATRIST

## 2024-09-16 PROCEDURE — 99212 OFFICE O/P EST SF 10 MIN: CPT | Performed by: PODIATRIST

## 2024-09-16 PROCEDURE — 3048F LDL-C <100 MG/DL: CPT | Performed by: PODIATRIST

## 2024-09-16 PROCEDURE — 4010F ACE/ARB THERAPY RXD/TAKEN: CPT | Performed by: PODIATRIST

## 2024-09-16 PROCEDURE — 3075F SYST BP GE 130 - 139MM HG: CPT | Performed by: PODIATRIST

## 2024-09-16 PROCEDURE — 1036F TOBACCO NON-USER: CPT | Performed by: PODIATRIST

## 2024-09-16 PROCEDURE — 3060F POS MICROALBUMINURIA REV: CPT | Performed by: PODIATRIST

## 2024-09-16 RX ORDER — ESTRADIOL/NORETHINDRONE ACETATE TRANSDERMAL SYSTEM .05; .14 MG/D; MG/D
1 PATCH, EXTENDED RELEASE TRANSDERMAL
COMMUNITY
Start: 2024-09-05 | End: 2025-09-05

## 2024-09-16 NOTE — PROGRESS NOTES
CC: diabetic foot exam     HPI: Pt presents for dm foot exam, no acute issues, not checking the sugars. AIC is still elevated, no acute issues.     PCP: Dr. Rey  Last visit: 5-124     PMH  Medical History           Past Medical History:   Diagnosis Date    Anogenital (venereal) warts       Genital warts    Depression, unspecified 11/03/2013     Depression    Encounter for screening for malignant neoplasm of vagina       Vaginal Pap smear    Osteoarthritis of knee, unspecified 11/03/2013     Osteoarthritis of knee    Other conditions influencing health status       History of pregnancy         MEDS     Current Outpatient Medications:     amLODIPine (Norvasc) 10 mg tablet, Take 1 tablet (10 mg) by mouth once daily., Disp: 90 tablet, Rfl: 3    aspirin 81 mg chewable tablet, Chew once daily., Disp: , Rfl:     atorvastatin (Lipitor) 20 mg tablet, Take 1 tablet (20 mg) by mouth once daily., Disp: 90 tablet, Rfl: 2    Basaglar KwikPen U-100 Insulin 100 unit/mL (3 mL) pen, inject 65 units Subcutaneously once a day, Disp: , Rfl:     carvedilol (Coreg) 6.25 mg tablet, Take 1 tablet (6.25 mg) by mouth 2 times a day with meals., Disp: 180 tablet, Rfl: 3    coenzyme Q-10 100 mg capsule, Take 1 capsule (100 mg) by mouth once daily., Disp: , Rfl:     glimepiride (Amaryl) 4 mg tablet, TAKE 1 TABLET BY MOUTH EVERY DAY with breakfast or the first main meal OF the day 30, Disp: , Rfl:     hydroCHLOROthiazide (HYDRODiuril) 25 mg tablet, Take 1 tablet (25 mg) by mouth once daily., Disp: 90 tablet, Rfl: 3    levothyroxine (Synthroid, Levoxyl) 25 mcg tablet, Take 1 tablet (25 mcg) by mouth once daily in the morning. Take before meals., Disp: 90 tablet, Rfl: 3    lisinopril 40 mg tablet, Take 1 tablet (40 mg) by mouth once daily., Disp: 90 tablet, Rfl: 3    metFORMIN (Glucophage) 1,000 mg tablet, Take 1 tablet (1,000 mg) by mouth 2 times a day with meals., Disp: 180 tablet, Rfl: 3    multivitamin tablet, Take 1 tablet by mouth once  "daily., Disp: , Rfl:     pantoprazole (ProtoNix) 40 mg EC tablet, Take 1 tablet (40 mg) by mouth once daily., Disp: , Rfl:     semaglutide (Ozempic) 1 mg/dose (2 mg/1.5 mL) pen injector, 1 (one) time per week., Disp: , Rfl:     vitamin E 180 mg (400 unit) capsule, Take 1 capsule (400 Units) by mouth once daily in the morning., Disp: , Rfl:     Accu-Chek Guide test strips strip, 2 times a day., Disp: , Rfl:     gabapentin (Neurontin) 100 mg capsule, Take 1 capsule (100 mg) by mouth once daily at bedtime., Disp: 90 capsule, Rfl: 1    lancets 30 gauge misc, 2 times a day., Disp: , Rfl:     pen needle, diabetic 32 gauge x 5/32\" needle, 2 times a day., Disp: , Rfl:     polyethylene glycol (Glycolax) 17 gram/dose powder, Take by mouth., Disp: , Rfl:     simvastatin (Zocor) 40 mg tablet, Take 1 tablet (40 mg) by mouth once daily at bedtime., Disp: 90 tablet, Rfl: 3  Allergies          Allergies   Allergen Reactions    Chlorthalidone Headache    Hydrocodone Unknown    Hydrocodone-Acetaminophen Itching    Levallorphan Unknown    Oxycodone Itching    Oxycodone-Acetaminophen Itching    Sulfamethoxazole Unknown    Trimethoprim Unknown    Hydromorphone Rash    Other Rash    Sulfamethoxazole-Trimethoprim Rash      Social History   Social History                Socioeconomic History    Marital status:        Spouse name: None    Number of children: None    Years of education: None    Highest education level: None   Occupational History    None   Tobacco Use    Smoking status: Never       Passive exposure: Never    Smokeless tobacco: Never   Substance and Sexual Activity    Alcohol use: Yes       Comment: rare    Drug use: Never    Sexual activity: None   Other Topics Concern    None   Social History Narrative    None      Social Determinants of Health      Financial Resource Strain: Not on file   Food Insecurity: Not on file   Transportation Needs: Not on file   Physical Activity: Not on file   Stress: Not on file   Social " Connections: Not on file   Intimate Partner Violence: Not on file   Housing Stability: Not on file         Family History               Family History   Problem Relation Name Age of Onset    Breast cancer Mother        Hypertension Father        Leukemia Father        Basal cell carcinoma Father             Surgical History             Past Surgical History:   Procedure Laterality Date    BREAST SURGERY   04/03/2014     Breast Surgery Reduction Procedure    HYSTEROSCOPY   02/19/2017     Hysteroscopy With Endometrial Ablation    OTHER SURGICAL HISTORY   11/19/2020     Shoulder surgery    OTHER SURGICAL HISTORY   04/03/2014     Oral Surgery            REVIEW OF SYSTEMS  DERM:   + as noted in HPI.         Physical examination:   On General Observation: Patient is a pleasant, cooperative, well developed 53 y.o. diabetic   adult female. The patient is alert and oriented to time, place and person. Patient has normal affect and mood.  Temp 36.6 °C (97.8 °F)      Vascular:  DP and PT pulses are 2/4 b/l.  mild edema noted. mild varicosities b/l.  CFT  6 seconds to all digits bilateral.  Skin temperature is warm to warm from proximal to distal bilateral.       Muscular: Strength is 5/5 for all instrinsic and extrinsic muscle groups.      Neuro:  Proprioception present.   Sensation to vibration is  present. Protective sensation present  at all pedal sites via [place] Sam 5.07 monofilament bilateral.  Light touch present bilateral.      Derm:    Left toenails: 1-5 Brittleness, crumbling upon debridement, subungual debris, elongation, mycotic appearance, tenderness, and thickness.   Right toenails: 1-5 Brittleness, crumbling upon debridement, subungual debris, elongation, mycotic appearance, tenderness, and thickness.   Hair growth is decreased b/l le     ASSESSMENT:       E11.65  Pain in right toe(s) [M79.674]   Pain in left toe(s) [M79.675]         PLAN:   Exam  DM foot care and DM foot manifestations were reviewed.  The  patient was educated on clinical findings, diagnosis and treatment plans. Patient understands all that has been explained and all questions were answered to apparent satisfaction. Working to lower sugars.           Deonte Wright DPM

## 2024-10-22 ENCOUNTER — OFFICE VISIT (OUTPATIENT)
Dept: PRIMARY CARE | Facility: CLINIC | Age: 54
End: 2024-10-22
Payer: COMMERCIAL

## 2024-10-22 ENCOUNTER — HOSPITAL ENCOUNTER (OUTPATIENT)
Dept: RADIOLOGY | Facility: CLINIC | Age: 54
Discharge: HOME | End: 2024-10-22
Payer: COMMERCIAL

## 2024-10-22 VITALS
DIASTOLIC BLOOD PRESSURE: 80 MMHG | TEMPERATURE: 97.4 F | OXYGEN SATURATION: 96 % | WEIGHT: 237.6 LBS | HEART RATE: 102 BPM | BODY MASS INDEX: 38.35 KG/M2 | SYSTOLIC BLOOD PRESSURE: 130 MMHG

## 2024-10-22 DIAGNOSIS — M79.671 RIGHT FOOT PAIN: ICD-10-CM

## 2024-10-22 DIAGNOSIS — Z23 NEED FOR TDAP VACCINATION: ICD-10-CM

## 2024-10-22 DIAGNOSIS — L03.115 CELLULITIS OF RIGHT LOWER EXTREMITY: Primary | ICD-10-CM

## 2024-10-22 DIAGNOSIS — L03.115 CELLULITIS OF RIGHT LOWER EXTREMITY: ICD-10-CM

## 2024-10-22 PROCEDURE — 3075F SYST BP GE 130 - 139MM HG: CPT | Performed by: STUDENT IN AN ORGANIZED HEALTH CARE EDUCATION/TRAINING PROGRAM

## 2024-10-22 PROCEDURE — 90715 TDAP VACCINE 7 YRS/> IM: CPT | Performed by: STUDENT IN AN ORGANIZED HEALTH CARE EDUCATION/TRAINING PROGRAM

## 2024-10-22 PROCEDURE — 3052F HG A1C>EQUAL 8.0%<EQUAL 9.0%: CPT | Performed by: STUDENT IN AN ORGANIZED HEALTH CARE EDUCATION/TRAINING PROGRAM

## 2024-10-22 PROCEDURE — 3079F DIAST BP 80-89 MM HG: CPT | Performed by: STUDENT IN AN ORGANIZED HEALTH CARE EDUCATION/TRAINING PROGRAM

## 2024-10-22 PROCEDURE — 4010F ACE/ARB THERAPY RXD/TAKEN: CPT | Performed by: STUDENT IN AN ORGANIZED HEALTH CARE EDUCATION/TRAINING PROGRAM

## 2024-10-22 PROCEDURE — 1036F TOBACCO NON-USER: CPT | Performed by: STUDENT IN AN ORGANIZED HEALTH CARE EDUCATION/TRAINING PROGRAM

## 2024-10-22 PROCEDURE — 90471 IMMUNIZATION ADMIN: CPT | Performed by: STUDENT IN AN ORGANIZED HEALTH CARE EDUCATION/TRAINING PROGRAM

## 2024-10-22 PROCEDURE — 99214 OFFICE O/P EST MOD 30 MIN: CPT | Performed by: STUDENT IN AN ORGANIZED HEALTH CARE EDUCATION/TRAINING PROGRAM

## 2024-10-22 PROCEDURE — 3048F LDL-C <100 MG/DL: CPT | Performed by: STUDENT IN AN ORGANIZED HEALTH CARE EDUCATION/TRAINING PROGRAM

## 2024-10-22 PROCEDURE — 73630 X-RAY EXAM OF FOOT: CPT | Mod: RT

## 2024-10-22 PROCEDURE — 73630 X-RAY EXAM OF FOOT: CPT | Mod: RIGHT SIDE | Performed by: RADIOLOGY

## 2024-10-22 PROCEDURE — 3060F POS MICROALBUMINURIA REV: CPT | Performed by: STUDENT IN AN ORGANIZED HEALTH CARE EDUCATION/TRAINING PROGRAM

## 2024-10-22 RX ORDER — AMOXICILLIN AND CLAVULANATE POTASSIUM 875; 125 MG/1; MG/1
875 TABLET, FILM COATED ORAL 2 TIMES DAILY
Qty: 14 TABLET | Refills: 0 | Status: SHIPPED | OUTPATIENT
Start: 2024-10-22 | End: 2024-10-29

## 2024-10-22 RX ORDER — CIPROFLOXACIN 500 MG/1
500 TABLET ORAL 2 TIMES DAILY
Qty: 10 TABLET | Refills: 0 | Status: SHIPPED | OUTPATIENT
Start: 2024-10-22 | End: 2024-10-27

## 2024-10-22 ASSESSMENT — ENCOUNTER SYMPTOMS
DEPRESSION: 0
FREQUENCY: 0
ABDOMINAL PAIN: 0
SHORTNESS OF BREATH: 0
DYSURIA: 0
DYSPHORIC MOOD: 0
PALPITATIONS: 0
WHEEZING: 0
DIZZINESS: 0
FATIGUE: 0
COUGH: 0
HEMATURIA: 0
HEADACHES: 0
NUMBNESS: 0
CONSTIPATION: 0
NAUSEA: 0
NERVOUS/ANXIOUS: 0
CHILLS: 0
DIARRHEA: 0
OCCASIONAL FEELINGS OF UNSTEADINESS: 0
FEVER: 0

## 2024-10-22 ASSESSMENT — PATIENT HEALTH QUESTIONNAIRE - PHQ9
1. LITTLE INTEREST OR PLEASURE IN DOING THINGS: NOT AT ALL
SUM OF ALL RESPONSES TO PHQ9 QUESTIONS 1 AND 2: 0
2. FEELING DOWN, DEPRESSED OR HOPELESS: NOT AT ALL

## 2024-10-22 NOTE — PROGRESS NOTES
Subjective   Patient ID: Yu Small is a 54 y.o. female who presents for Foot Injury (Right, injury 1 week ago, Redness, swelling, dog walked on her foot/Last TDAP unknown).    HPI   A week ago, dog was running and stepped on her foot. Dog was running into the street.   His nail scraped across it. She was able to limp in at least. Has been walking on it. She washed it, put neosporin on it and elevating it.  Has been bruised and swollen.   It started getting red yesterday.     Review of Systems   Constitutional:  Negative for chills, fatigue and fever.   Respiratory:  Negative for cough, shortness of breath and wheezing.    Cardiovascular:  Negative for chest pain, palpitations and leg swelling.   Gastrointestinal:  Negative for abdominal pain, constipation, diarrhea and nausea.   Genitourinary:  Negative for dysuria, frequency, hematuria and urgency.   Neurological:  Negative for dizziness, numbness and headaches.   Psychiatric/Behavioral:  Negative for dysphoric mood. The patient is not nervous/anxious.        Objective   /80 (BP Location: Right arm, Patient Position: Sitting, BP Cuff Size: Large adult)   Pulse 102   Temp 36.3 °C (97.4 °F) (Temporal)   Wt 108 kg (237 lb 9.6 oz)   SpO2 96%   BMI 38.35 kg/m²     Physical Exam  Vitals reviewed.   Constitutional:       Appearance: Normal appearance.   HENT:      Head: Normocephalic and atraumatic.   Eyes:      Extraocular Movements: Extraocular movements intact.      Pupils: Pupils are equal, round, and reactive to light.   Cardiovascular:      Pulses:           Dorsalis pedis pulses are 2+ on the right side.   Pulmonary:      Effort: Pulmonary effort is normal.   Musculoskeletal:        Feet:    Feet:      Right foot:      Skin integrity: Erythema and warmth present.      Comments: Laceration with surrounding erythema, warmth, swelling, and tenderness.  No drainage noted.  Skin:     General: Skin is warm and dry.   Neurological:      General: No  focal deficit present.      Mental Status: She is alert.   Psychiatric:         Mood and Affect: Mood normal.         Behavior: Behavior normal.         Thought Content: Thought content normal.         Assessment/Plan   Problem List Items Addressed This Visit    None  Visit Diagnoses       Cellulitis of right lower extremity    -  Primary    Relevant Medications    amoxicillin-pot clavulanate (Augmentin) 875-125 mg tablet    ciprofloxacin (Cipro) 500 mg tablet    Other Relevant Orders    XR foot right 3+ views    Right foot pain        Relevant Orders    XR foot right 3+ views    Need for Tdap vaccination        Relevant Orders    Tdap vaccine, age 7 years and older (Completed)          Prescriptions for Augmentin and Cipro sent to pharmacy.  Will check an x-ray of her foot.  Will see her back in a week but did discuss that if this worsens that she needs to let me know or go to the emergency department    Audra Cleaning,   10/22/2024

## 2024-10-29 ENCOUNTER — APPOINTMENT (OUTPATIENT)
Dept: PRIMARY CARE | Facility: CLINIC | Age: 54
End: 2024-10-29
Payer: COMMERCIAL

## 2024-10-29 VITALS
SYSTOLIC BLOOD PRESSURE: 140 MMHG | HEART RATE: 102 BPM | TEMPERATURE: 98 F | DIASTOLIC BLOOD PRESSURE: 70 MMHG | OXYGEN SATURATION: 96 %

## 2024-10-29 DIAGNOSIS — L03.115 CELLULITIS OF RIGHT LOWER EXTREMITY: Primary | ICD-10-CM

## 2024-10-29 PROCEDURE — 3052F HG A1C>EQUAL 8.0%<EQUAL 9.0%: CPT | Performed by: STUDENT IN AN ORGANIZED HEALTH CARE EDUCATION/TRAINING PROGRAM

## 2024-10-29 PROCEDURE — 3077F SYST BP >= 140 MM HG: CPT | Performed by: STUDENT IN AN ORGANIZED HEALTH CARE EDUCATION/TRAINING PROGRAM

## 2024-10-29 PROCEDURE — 3078F DIAST BP <80 MM HG: CPT | Performed by: STUDENT IN AN ORGANIZED HEALTH CARE EDUCATION/TRAINING PROGRAM

## 2024-10-29 PROCEDURE — 3060F POS MICROALBUMINURIA REV: CPT | Performed by: STUDENT IN AN ORGANIZED HEALTH CARE EDUCATION/TRAINING PROGRAM

## 2024-10-29 PROCEDURE — 99214 OFFICE O/P EST MOD 30 MIN: CPT | Performed by: STUDENT IN AN ORGANIZED HEALTH CARE EDUCATION/TRAINING PROGRAM

## 2024-10-29 PROCEDURE — 3048F LDL-C <100 MG/DL: CPT | Performed by: STUDENT IN AN ORGANIZED HEALTH CARE EDUCATION/TRAINING PROGRAM

## 2024-10-29 PROCEDURE — 4010F ACE/ARB THERAPY RXD/TAKEN: CPT | Performed by: STUDENT IN AN ORGANIZED HEALTH CARE EDUCATION/TRAINING PROGRAM

## 2024-10-29 RX ORDER — DOXYCYCLINE 100 MG/1
100 CAPSULE ORAL 2 TIMES DAILY
Qty: 14 CAPSULE | Refills: 0 | Status: SHIPPED | OUTPATIENT
Start: 2024-10-29 | End: 2024-11-05

## 2024-10-29 ASSESSMENT — ENCOUNTER SYMPTOMS
FREQUENCY: 0
HEMATURIA: 0
NUMBNESS: 0
DIARRHEA: 0
ABDOMINAL PAIN: 0
DIZZINESS: 0
OCCASIONAL FEELINGS OF UNSTEADINESS: 0
NERVOUS/ANXIOUS: 0
SHORTNESS OF BREATH: 0
DEPRESSION: 0
NAUSEA: 0
COUGH: 0
DYSPHORIC MOOD: 0
FATIGUE: 0
CONSTIPATION: 0
WHEEZING: 0
PALPITATIONS: 0
DYSURIA: 0
HEADACHES: 0
FEVER: 0
CHILLS: 0

## 2024-10-29 NOTE — PROGRESS NOTES
Subjective   Patient ID: Yu Small is a 54 y.o. female who presents for Follow-up (Right foot wound, ankle swelling noted with redness).    HPI   A week ago, dog was running and stepped on her foot. Dog was running into the street.   His nail scraped across it. She was able to limp in at least. Has been walking on it. She washed it, put neosporin on it and elevating it.  Has been bruised and swollen.   It started getting red yesterday.     Review of Systems   Constitutional:  Negative for chills, fatigue and fever.   Respiratory:  Negative for cough, shortness of breath and wheezing.    Cardiovascular:  Negative for chest pain, palpitations and leg swelling.   Gastrointestinal:  Negative for abdominal pain, constipation, diarrhea and nausea.   Genitourinary:  Negative for dysuria, frequency, hematuria and urgency.   Neurological:  Negative for dizziness, numbness and headaches.   Psychiatric/Behavioral:  Negative for dysphoric mood. The patient is not nervous/anxious.        Objective   /70 (BP Location: Right arm, Patient Position: Sitting, BP Cuff Size: Large adult)   Pulse 102   Temp 36.7 °C (98 °F) (Temporal)   SpO2 96%     Physical Exam  Vitals reviewed.   Constitutional:       Appearance: Normal appearance.   HENT:      Head: Normocephalic and atraumatic.   Eyes:      Extraocular Movements: Extraocular movements intact.      Pupils: Pupils are equal, round, and reactive to light.   Cardiovascular:      Pulses:           Dorsalis pedis pulses are 2+ on the right side.   Pulmonary:      Effort: Pulmonary effort is normal.   Musculoskeletal:        Feet:    Feet:      Right foot:      Skin integrity: Erythema and warmth present.      Comments: Erythema, warmth, swelling, and tenderness.  No drainage noted.  Skin:     General: Skin is warm and dry.   Neurological:      General: No focal deficit present.      Mental Status: She is alert.   Psychiatric:         Mood and Affect: Mood normal.          Behavior: Behavior normal.         Thought Content: Thought content normal.         Assessment/Plan   Problem List Items Addressed This Visit    None  Visit Diagnoses       Cellulitis of right lower extremity    -  Primary    Relevant Medications    doxycycline (Vibramycin) 100 mg capsule            Will start doxycycline and see her back in a week.  Warmth and swelling slightly improved but does still appear to be infected    Audra Cleaning,   11/4/2024

## 2024-11-04 DIAGNOSIS — E78.5 HYPERLIPIDEMIA, UNSPECIFIED HYPERLIPIDEMIA TYPE: ICD-10-CM

## 2024-11-04 RX ORDER — ATORVASTATIN CALCIUM 20 MG/1
20 TABLET, FILM COATED ORAL DAILY
Qty: 90 TABLET | Refills: 3 | Status: SHIPPED | OUTPATIENT
Start: 2024-11-04

## 2024-11-05 ENCOUNTER — APPOINTMENT (OUTPATIENT)
Dept: PRIMARY CARE | Facility: CLINIC | Age: 54
End: 2024-11-05
Payer: COMMERCIAL

## 2024-11-05 VITALS
OXYGEN SATURATION: 95 % | TEMPERATURE: 97.4 F | DIASTOLIC BLOOD PRESSURE: 70 MMHG | SYSTOLIC BLOOD PRESSURE: 134 MMHG | HEART RATE: 93 BPM

## 2024-11-05 DIAGNOSIS — L03.115 CELLULITIS OF RIGHT LOWER EXTREMITY: Primary | ICD-10-CM

## 2024-11-05 PROCEDURE — 3052F HG A1C>EQUAL 8.0%<EQUAL 9.0%: CPT | Performed by: STUDENT IN AN ORGANIZED HEALTH CARE EDUCATION/TRAINING PROGRAM

## 2024-11-05 PROCEDURE — 99213 OFFICE O/P EST LOW 20 MIN: CPT | Performed by: STUDENT IN AN ORGANIZED HEALTH CARE EDUCATION/TRAINING PROGRAM

## 2024-11-05 PROCEDURE — 3075F SYST BP GE 130 - 139MM HG: CPT | Performed by: STUDENT IN AN ORGANIZED HEALTH CARE EDUCATION/TRAINING PROGRAM

## 2024-11-05 PROCEDURE — 3078F DIAST BP <80 MM HG: CPT | Performed by: STUDENT IN AN ORGANIZED HEALTH CARE EDUCATION/TRAINING PROGRAM

## 2024-11-05 PROCEDURE — 4010F ACE/ARB THERAPY RXD/TAKEN: CPT | Performed by: STUDENT IN AN ORGANIZED HEALTH CARE EDUCATION/TRAINING PROGRAM

## 2024-11-05 PROCEDURE — 3060F POS MICROALBUMINURIA REV: CPT | Performed by: STUDENT IN AN ORGANIZED HEALTH CARE EDUCATION/TRAINING PROGRAM

## 2024-11-05 PROCEDURE — 3048F LDL-C <100 MG/DL: CPT | Performed by: STUDENT IN AN ORGANIZED HEALTH CARE EDUCATION/TRAINING PROGRAM

## 2024-11-05 ASSESSMENT — ENCOUNTER SYMPTOMS
FATIGUE: 0
FREQUENCY: 0
SHORTNESS OF BREATH: 0
NUMBNESS: 0
WHEEZING: 0
DYSURIA: 0
CONSTIPATION: 0
ABDOMINAL PAIN: 0
FEVER: 0
CHILLS: 0
NAUSEA: 0
DIZZINESS: 0
DYSPHORIC MOOD: 0
DIARRHEA: 0
HEMATURIA: 0
NERVOUS/ANXIOUS: 0
COUGH: 0
PALPITATIONS: 0
HEADACHES: 0

## 2024-11-05 NOTE — PROGRESS NOTES
Subjective   Patient ID: Yu Small is a 54 y.o. female who presents for Follow-up (Right foot wound).    HPI   Patient here for follow-up of her right foot wound.  The swelling has improved, she did not have any swelling this morning as started to get some as the day went on.  Does have stasis dermatitis.  No further erythema.  No pain.    Previously…  A week ago, dog was running and stepped on her foot. Dog was running into the street.   His nail scraped across it. She was able to limp in at least. Has been walking on it. She washed it, put neosporin on it and elevating it.  Has been bruised and swollen.   It started getting red yesterday.     Review of Systems   Constitutional:  Negative for chills, fatigue and fever.   Respiratory:  Negative for cough, shortness of breath and wheezing.    Cardiovascular:  Negative for chest pain, palpitations and leg swelling.   Gastrointestinal:  Negative for abdominal pain, constipation, diarrhea and nausea.   Genitourinary:  Negative for dysuria, frequency, hematuria and urgency.   Neurological:  Negative for dizziness, numbness and headaches.   Psychiatric/Behavioral:  Negative for dysphoric mood. The patient is not nervous/anxious.        Objective   /70 (BP Location: Right arm, Patient Position: Sitting, BP Cuff Size: Large adult)   Pulse 93   Temp 36.3 °C (97.4 °F) (Temporal)   SpO2 95%     Physical Exam  Vitals reviewed.   Constitutional:       Appearance: Normal appearance.   HENT:      Head: Normocephalic and atraumatic.   Eyes:      Extraocular Movements: Extraocular movements intact.      Pupils: Pupils are equal, round, and reactive to light.   Cardiovascular:      Pulses:           Dorsalis pedis pulses are 2+ on the right side.   Pulmonary:      Effort: Pulmonary effort is normal.   Feet:      Right foot:      Skin integrity: No erythema or warmth.   Skin:     General: Skin is warm and dry.   Neurological:      General: No focal deficit present.       Mental Status: She is alert.   Psychiatric:         Mood and Affect: Mood normal.         Behavior: Behavior normal.         Thought Content: Thought content normal.         Assessment/Plan   Problem List Items Addressed This Visit    None  Visit Diagnoses       Cellulitis of right lower extremity    -  Primary          Much improved and resolved.  Will follow-up as needed    Audra Cleaning,   11/5/2024

## 2024-11-14 ENCOUNTER — APPOINTMENT (OUTPATIENT)
Dept: PRIMARY CARE | Facility: CLINIC | Age: 54
End: 2024-11-14
Payer: COMMERCIAL

## 2024-11-14 VITALS
WEIGHT: 233.4 LBS | DIASTOLIC BLOOD PRESSURE: 82 MMHG | SYSTOLIC BLOOD PRESSURE: 138 MMHG | TEMPERATURE: 97.7 F | BODY MASS INDEX: 37.67 KG/M2

## 2024-11-14 DIAGNOSIS — G62.9 NEUROPATHY: ICD-10-CM

## 2024-11-14 DIAGNOSIS — I10 BENIGN ESSENTIAL HYPERTENSION: Primary | ICD-10-CM

## 2024-11-14 DIAGNOSIS — E11.9 TYPE 2 DIABETES MELLITUS WITHOUT COMPLICATION, WITHOUT LONG-TERM CURRENT USE OF INSULIN (MULTI): ICD-10-CM

## 2024-11-14 DIAGNOSIS — E78.5 HYPERLIPIDEMIA, UNSPECIFIED HYPERLIPIDEMIA TYPE: ICD-10-CM

## 2024-11-14 PROCEDURE — 3060F POS MICROALBUMINURIA REV: CPT | Performed by: INTERNAL MEDICINE

## 2024-11-14 PROCEDURE — 1036F TOBACCO NON-USER: CPT | Performed by: INTERNAL MEDICINE

## 2024-11-14 PROCEDURE — 3079F DIAST BP 80-89 MM HG: CPT | Performed by: INTERNAL MEDICINE

## 2024-11-14 PROCEDURE — 4010F ACE/ARB THERAPY RXD/TAKEN: CPT | Performed by: INTERNAL MEDICINE

## 2024-11-14 PROCEDURE — 3052F HG A1C>EQUAL 8.0%<EQUAL 9.0%: CPT | Performed by: INTERNAL MEDICINE

## 2024-11-14 PROCEDURE — 3048F LDL-C <100 MG/DL: CPT | Performed by: INTERNAL MEDICINE

## 2024-11-14 PROCEDURE — 3075F SYST BP GE 130 - 139MM HG: CPT | Performed by: INTERNAL MEDICINE

## 2024-11-14 PROCEDURE — 99214 OFFICE O/P EST MOD 30 MIN: CPT | Performed by: INTERNAL MEDICINE

## 2024-11-14 RX ORDER — INSULIN LISPRO 100 [IU]/ML
INJECTION, SOLUTION INTRAVENOUS; SUBCUTANEOUS
COMMUNITY
Start: 2024-11-12

## 2024-11-14 RX ORDER — GABAPENTIN 100 MG/1
100 CAPSULE ORAL NIGHTLY
Qty: 90 CAPSULE | Refills: 1 | Status: SHIPPED | OUTPATIENT
Start: 2024-11-14 | End: 2025-05-13

## 2024-11-14 ASSESSMENT — PATIENT HEALTH QUESTIONNAIRE - PHQ9
SUM OF ALL RESPONSES TO PHQ9 QUESTIONS 1 AND 2: 0
2. FEELING DOWN, DEPRESSED OR HOPELESS: NOT AT ALL
1. LITTLE INTEREST OR PLEASURE IN DOING THINGS: NOT AT ALL

## 2024-11-14 NOTE — PROGRESS NOTES
Subjective   Patient ID: Yu Small is a 54 y.o. female who presents for Follow-up (6 months).    Med Refill     Overall well  #1 hypertension-  at home Bps 130/70.   #2 diabetes- following w/ endo, A1c=8.2. on increased insulin.  #3 hyperlipidemia- liitle exercise.   #4 anemia/leukocytosis/thrombocytosis- no bleeding/bruising  #5 rt shoulder- f/u ortho, information provided for second opinions.  #6 leg cramps-stable.  #7 tachycardia- normal Holter monitor. Follow-up cardiology  #8 fatty liver- f/u GI. appt pending today, s/p Bx.   #9 subclinical hypothyroidism- better on rx. retest 6 mths  #10 colon polyps. s/p colo (dr stout) 4/21--> f/u GI.       Review of Systems   All other systems reviewed and are negative.      Objective   /82   Temp 36.5 °C (97.7 °F)   Wt 106 kg (233 lb 6.4 oz)   BMI 37.67 kg/m²     Physical Exam  Constitutional:       Appearance: Normal appearance.   Cardiovascular:      Rate and Rhythm: Normal rate and regular rhythm.      Pulses: Normal pulses.      Heart sounds: Normal heart sounds. No murmur heard.  Pulmonary:      Effort: Pulmonary effort is normal. No respiratory distress.      Breath sounds: Normal breath sounds. No wheezing, rhonchi or rales.   Neurological:      Mental Status: She is alert.   Psychiatric:         Mood and Affect: Mood normal.         Behavior: Behavior normal.         Thought Content: Thought content normal.         Judgment: Judgment normal.     Lab Results   Component Value Date    WBC 10.2 05/24/2024    HGB 12.9 05/24/2024    HCT 40.0 05/24/2024     05/24/2024    CHOL 148 05/10/2024    TRIG 172 (H) 05/10/2024    HDL 35.6 05/10/2024    ALT 54 (H) 05/10/2024    AST 31 05/10/2024     05/10/2024    K 4.0 05/10/2024     05/10/2024    CREATININE 0.74 05/10/2024    BUN 16 05/10/2024    CO2 29 05/10/2024    TSH 3.57 05/24/2024    INR 1.0 02/10/2021    HGBA1C 8.2 (H) 05/10/2024     A1c- (11/3/24) = 7.5    Assessment/Plan     #1  hypertension- on target  #2 diabetes- follow-up endocrinology   #3 hyperlipidemia- on target   #4 anemia/leukocytosis/thrombocytosis- Follow-up with GI as well as hematology.   #5 rt shoulder- good.  f/u ortho  #6 leg cramps-stable.  #7 tachycardia- normal Holter monitor. Follow-up cardiology  #8 fatty liver- f/u GI. appt pending, s/p Bx.   Appt pending 5/25  #9 subclinical hypothyroidism- better on rx. retest    #10 colon polyps. s/p colo (dr stout) 4/21--> f/u GI, appt pending today  #11 neuropathy- reviewed foot care.  f/u  w/ podiatry.  Gabapentin 100 mg po at bedtime  #12 foot infection- resolved.  f/u  PRN     Increased BMI- discussed diet and exercise  Follow-up gynecology for Pap/mammogram

## 2025-01-06 ENCOUNTER — APPOINTMENT (OUTPATIENT)
Dept: PODIATRY | Facility: CLINIC | Age: 55
End: 2025-01-06
Payer: COMMERCIAL

## 2025-01-06 DIAGNOSIS — M79.675 TOE PAIN, LEFT: ICD-10-CM

## 2025-01-06 DIAGNOSIS — S90.31XA CONTUSION OF RIGHT FOOT, INITIAL ENCOUNTER: Primary | ICD-10-CM

## 2025-01-06 DIAGNOSIS — M79.674 TOE PAIN, RIGHT: ICD-10-CM

## 2025-01-06 DIAGNOSIS — E11.65 POORLY CONTROLLED DIABETES MELLITUS (MULTI): ICD-10-CM

## 2025-01-06 PROCEDURE — 1036F TOBACCO NON-USER: CPT | Performed by: PODIATRIST

## 2025-01-06 PROCEDURE — 4010F ACE/ARB THERAPY RXD/TAKEN: CPT | Performed by: PODIATRIST

## 2025-01-06 PROCEDURE — 99212 OFFICE O/P EST SF 10 MIN: CPT | Performed by: PODIATRIST

## 2025-01-06 NOTE — PROGRESS NOTES
CC: diabetic foot exam     HPI: Pt presents for dm foot exam, no acute issues, not checking the sugars. AIC is still elevated, She did have the dog step on foot 2 weeks ago, had xrays done, still slight swelling.     PCP: Dr. Rey  Last visit: 11-14-24     PMH  Medical History           Past Medical History:   Diagnosis Date    Anogenital (venereal) warts       Genital warts    Depression, unspecified 11/03/2013     Depression    Encounter for screening for malignant neoplasm of vagina       Vaginal Pap smear    Osteoarthritis of knee, unspecified 11/03/2013     Osteoarthritis of knee    Other conditions influencing health status       History of pregnancy         MEDS     Current Outpatient Medications:     amLODIPine (Norvasc) 10 mg tablet, Take 1 tablet (10 mg) by mouth once daily., Disp: 90 tablet, Rfl: 3    aspirin 81 mg chewable tablet, Chew once daily., Disp: , Rfl:     atorvastatin (Lipitor) 20 mg tablet, Take 1 tablet (20 mg) by mouth once daily., Disp: 90 tablet, Rfl: 2    Basaglar KwikPen U-100 Insulin 100 unit/mL (3 mL) pen, inject 65 units Subcutaneously once a day, Disp: , Rfl:     carvedilol (Coreg) 6.25 mg tablet, Take 1 tablet (6.25 mg) by mouth 2 times a day with meals., Disp: 180 tablet, Rfl: 3    coenzyme Q-10 100 mg capsule, Take 1 capsule (100 mg) by mouth once daily., Disp: , Rfl:     glimepiride (Amaryl) 4 mg tablet, TAKE 1 TABLET BY MOUTH EVERY DAY with breakfast or the first main meal OF the day 30, Disp: , Rfl:     hydroCHLOROthiazide (HYDRODiuril) 25 mg tablet, Take 1 tablet (25 mg) by mouth once daily., Disp: 90 tablet, Rfl: 3    levothyroxine (Synthroid, Levoxyl) 25 mcg tablet, Take 1 tablet (25 mcg) by mouth once daily in the morning. Take before meals., Disp: 90 tablet, Rfl: 3    lisinopril 40 mg tablet, Take 1 tablet (40 mg) by mouth once daily., Disp: 90 tablet, Rfl: 3    metFORMIN (Glucophage) 1,000 mg tablet, Take 1 tablet (1,000 mg) by mouth 2 times a day with meals., Disp: 180  "tablet, Rfl: 3    multivitamin tablet, Take 1 tablet by mouth once daily., Disp: , Rfl:     pantoprazole (ProtoNix) 40 mg EC tablet, Take 1 tablet (40 mg) by mouth once daily., Disp: , Rfl:     semaglutide (Ozempic) 1 mg/dose (2 mg/1.5 mL) pen injector, 1 (one) time per week., Disp: , Rfl:     vitamin E 180 mg (400 unit) capsule, Take 1 capsule (400 Units) by mouth once daily in the morning., Disp: , Rfl:     Accu-Chek Guide test strips strip, 2 times a day., Disp: , Rfl:     gabapentin (Neurontin) 100 mg capsule, Take 1 capsule (100 mg) by mouth once daily at bedtime., Disp: 90 capsule, Rfl: 1    lancets 30 gauge misc, 2 times a day., Disp: , Rfl:     pen needle, diabetic 32 gauge x 5/32\" needle, 2 times a day., Disp: , Rfl:     polyethylene glycol (Glycolax) 17 gram/dose powder, Take by mouth., Disp: , Rfl:     simvastatin (Zocor) 40 mg tablet, Take 1 tablet (40 mg) by mouth once daily at bedtime., Disp: 90 tablet, Rfl: 3  Allergies          Allergies   Allergen Reactions    Chlorthalidone Headache    Hydrocodone Unknown    Hydrocodone-Acetaminophen Itching    Levallorphan Unknown    Oxycodone Itching    Oxycodone-Acetaminophen Itching    Sulfamethoxazole Unknown    Trimethoprim Unknown    Hydromorphone Rash    Other Rash    Sulfamethoxazole-Trimethoprim Rash      Social History   Social History                Socioeconomic History    Marital status:        Spouse name: None    Number of children: None    Years of education: None    Highest education level: None   Occupational History    None   Tobacco Use    Smoking status: Never       Passive exposure: Never    Smokeless tobacco: Never   Substance and Sexual Activity    Alcohol use: Yes       Comment: rare    Drug use: Never    Sexual activity: None   Other Topics Concern    None   Social History Narrative    None      Social Determinants of Health      Financial Resource Strain: Not on file   Food Insecurity: Not on file   Transportation Needs: Not on " file   Physical Activity: Not on file   Stress: Not on file   Social Connections: Not on file   Intimate Partner Violence: Not on file   Housing Stability: Not on file         Family History               Family History   Problem Relation Name Age of Onset    Breast cancer Mother        Hypertension Father        Leukemia Father        Basal cell carcinoma Father             Surgical History             Past Surgical History:   Procedure Laterality Date    BREAST SURGERY   04/03/2014     Breast Surgery Reduction Procedure    HYSTEROSCOPY   02/19/2017     Hysteroscopy With Endometrial Ablation    OTHER SURGICAL HISTORY   11/19/2020     Shoulder surgery    OTHER SURGICAL HISTORY   04/03/2014     Oral Surgery            REVIEW OF SYSTEMS  DERM:   + as noted in HPI.         Physical examination:   On General Observation: Patient is a pleasant, cooperative, well developed 53 y.o. diabetic   adult female. The patient is alert and oriented to time, place and person. Patient has normal affect and mood.  Temp 36.6 °C (97.8 °F)      Vascular:  DP and PT pulses are 2/4 b/l.  mild edema noted. mild varicosities b/l.  CFT  6 seconds to all digits bilateral.  Skin temperature is warm to warm from proximal to distal bilateral.       Muscular: Strength is 5/5 for all instrinsic and extrinsic muscle groups.      Neuro:  Proprioception present.   Sensation to vibration is  present. Protective sensation is decreased toes via [place] Sam 5.07 monofilament bilateral.  Light touch present bilateral.      Derm:    Left toenails: 1-5 Brittleness, crumbling upon debridement, subungual debris, elongation, mycotic appearance, tenderness, and thickness.   Right toenails: 1-5 Brittleness, crumbling upon debridement, subungual debris, elongation, mycotic appearance, tenderness, and thickness.   Hair growth is decreased b/l le     Ortho:  Slight swelling and pain is present dorsal right foot.    ASSESSMENT:     Conutsion right  foot  E11.65  Pain in right toe(s) [M79.674]   Pain in left toe(s) [M79.675]         PLAN:   Exam  Exam  Reviewed xrays with pt and treatment options, she does not want new xrays, recommmend compression therapy/brace, she is going to [place] next week.  DM foot care and DM foot manifestations were reviewed.  The patient was educated on clinical findings, diagnosis and treatment plans. Patient understands all that has been explained and all questions were answered to apparent satisfaction. Working to lower sugars.           Deonte Wright DPM

## 2025-02-03 DIAGNOSIS — I10 ESSENTIAL (PRIMARY) HYPERTENSION: ICD-10-CM

## 2025-02-03 DIAGNOSIS — E03.8 OTHER SPECIFIED HYPOTHYROIDISM: ICD-10-CM

## 2025-02-03 DIAGNOSIS — I10 BENIGN ESSENTIAL HYPERTENSION: ICD-10-CM

## 2025-02-03 DIAGNOSIS — E11.9 TYPE 2 DIABETES MELLITUS WITHOUT COMPLICATION, WITHOUT LONG-TERM CURRENT USE OF INSULIN (MULTI): ICD-10-CM

## 2025-02-03 RX ORDER — LISINOPRIL 40 MG/1
40 TABLET ORAL DAILY
Qty: 90 TABLET | Refills: 3 | Status: SHIPPED | OUTPATIENT
Start: 2025-02-03

## 2025-02-03 RX ORDER — LEVOTHYROXINE SODIUM 25 UG/1
25 TABLET ORAL
Qty: 90 TABLET | Refills: 3 | Status: SHIPPED | OUTPATIENT
Start: 2025-02-03

## 2025-02-03 RX ORDER — HYDROCHLOROTHIAZIDE 25 MG/1
25 TABLET ORAL DAILY
Qty: 90 TABLET | Refills: 3 | Status: SHIPPED | OUTPATIENT
Start: 2025-02-03

## 2025-02-03 RX ORDER — CARVEDILOL 6.25 MG/1
6.25 TABLET ORAL
Qty: 180 TABLET | Refills: 3 | Status: SHIPPED | OUTPATIENT
Start: 2025-02-03

## 2025-02-03 RX ORDER — AMLODIPINE BESYLATE 10 MG/1
10 TABLET ORAL DAILY
Qty: 90 TABLET | Refills: 3 | Status: SHIPPED | OUTPATIENT
Start: 2025-02-03

## 2025-02-03 RX ORDER — METFORMIN HYDROCHLORIDE 1000 MG/1
1000 TABLET ORAL
Qty: 180 TABLET | Refills: 3 | Status: SHIPPED | OUTPATIENT
Start: 2025-02-03

## 2025-04-07 ENCOUNTER — APPOINTMENT (OUTPATIENT)
Dept: PODIATRY | Facility: CLINIC | Age: 55
End: 2025-04-07
Payer: COMMERCIAL

## 2025-04-07 DIAGNOSIS — E11.42 DIABETIC POLYNEUROPATHY ASSOCIATED WITH TYPE 2 DIABETES MELLITUS: Primary | ICD-10-CM

## 2025-04-07 DIAGNOSIS — Z79.4 ENCOUNTER FOR LONG-TERM (CURRENT) USE OF INSULIN: ICD-10-CM

## 2025-04-07 PROCEDURE — 99212 OFFICE O/P EST SF 10 MIN: CPT | Performed by: PODIATRIST

## 2025-04-07 PROCEDURE — 1036F TOBACCO NON-USER: CPT | Performed by: PODIATRIST

## 2025-04-07 PROCEDURE — 4010F ACE/ARB THERAPY RXD/TAKEN: CPT | Performed by: PODIATRIST

## 2025-04-07 NOTE — PROGRESS NOTES
CC: diabetic foot exam     HPI: Pt presents for dm foot exam, no acute issues, not checking the sugars. AIC is still elevated, improved at 6.6. On  metformin and insulin.     PCP: Dr. Rey  Last visit: 11-14-24     PMH  Medical History           Past Medical History:   Diagnosis Date    Anogenital (venereal) warts       Genital warts    Depression, unspecified 11/03/2013     Depression    Encounter for screening for malignant neoplasm of vagina       Vaginal Pap smear    Osteoarthritis of knee, unspecified 11/03/2013     Osteoarthritis of knee    Other conditions influencing health status       History of pregnancy         MEDS     Current Outpatient Medications:     amLODIPine (Norvasc) 10 mg tablet, Take 1 tablet (10 mg) by mouth once daily., Disp: 90 tablet, Rfl: 3    aspirin 81 mg chewable tablet, Chew once daily., Disp: , Rfl:     atorvastatin (Lipitor) 20 mg tablet, Take 1 tablet (20 mg) by mouth once daily., Disp: 90 tablet, Rfl: 2    Basaglar KwikPen U-100 Insulin 100 unit/mL (3 mL) pen, inject 65 units Subcutaneously once a day, Disp: , Rfl:     carvedilol (Coreg) 6.25 mg tablet, Take 1 tablet (6.25 mg) by mouth 2 times a day with meals., Disp: 180 tablet, Rfl: 3    coenzyme Q-10 100 mg capsule, Take 1 capsule (100 mg) by mouth once daily., Disp: , Rfl:     glimepiride (Amaryl) 4 mg tablet, TAKE 1 TABLET BY MOUTH EVERY DAY with breakfast or the first main meal OF the day 30, Disp: , Rfl:     hydroCHLOROthiazide (HYDRODiuril) 25 mg tablet, Take 1 tablet (25 mg) by mouth once daily., Disp: 90 tablet, Rfl: 3    levothyroxine (Synthroid, Levoxyl) 25 mcg tablet, Take 1 tablet (25 mcg) by mouth once daily in the morning. Take before meals., Disp: 90 tablet, Rfl: 3    lisinopril 40 mg tablet, Take 1 tablet (40 mg) by mouth once daily., Disp: 90 tablet, Rfl: 3    metFORMIN (Glucophage) 1,000 mg tablet, Take 1 tablet (1,000 mg) by mouth 2 times a day with meals., Disp: 180 tablet, Rfl: 3    multivitamin tablet, Take  "1 tablet by mouth once daily., Disp: , Rfl:     pantoprazole (ProtoNix) 40 mg EC tablet, Take 1 tablet (40 mg) by mouth once daily., Disp: , Rfl:     semaglutide (Ozempic) 1 mg/dose (2 mg/1.5 mL) pen injector, 1 (one) time per week., Disp: , Rfl:     vitamin E 180 mg (400 unit) capsule, Take 1 capsule (400 Units) by mouth once daily in the morning., Disp: , Rfl:     Accu-Chek Guide test strips strip, 2 times a day., Disp: , Rfl:     gabapentin (Neurontin) 100 mg capsule, Take 1 capsule (100 mg) by mouth once daily at bedtime., Disp: 90 capsule, Rfl: 1    lancets 30 gauge misc, 2 times a day., Disp: , Rfl:     pen needle, diabetic 32 gauge x 5/32\" needle, 2 times a day., Disp: , Rfl:     polyethylene glycol (Glycolax) 17 gram/dose powder, Take by mouth., Disp: , Rfl:     simvastatin (Zocor) 40 mg tablet, Take 1 tablet (40 mg) by mouth once daily at bedtime., Disp: 90 tablet, Rfl: 3  Allergies          Allergies   Allergen Reactions    Chlorthalidone Headache    Hydrocodone Unknown    Hydrocodone-Acetaminophen Itching    Levallorphan Unknown    Oxycodone Itching    Oxycodone-Acetaminophen Itching    Sulfamethoxazole Unknown    Trimethoprim Unknown    Hydromorphone Rash    Other Rash    Sulfamethoxazole-Trimethoprim Rash      Social History   Social History                Socioeconomic History    Marital status:        Spouse name: None    Number of children: None    Years of education: None    Highest education level: None   Occupational History    None   Tobacco Use    Smoking status: Never       Passive exposure: Never    Smokeless tobacco: Never   Substance and Sexual Activity    Alcohol use: Yes       Comment: rare    Drug use: Never    Sexual activity: None   Other Topics Concern    None   Social History Narrative    None      Social Determinants of Health      Financial Resource Strain: Not on file   Food Insecurity: Not on file   Transportation Needs: Not on file   Physical Activity: Not on file "   Stress: Not on file   Social Connections: Not on file   Intimate Partner Violence: Not on file   Housing Stability: Not on file         Family History               Family History   Problem Relation Name Age of Onset    Breast cancer Mother        Hypertension Father        Leukemia Father        Basal cell carcinoma Father             Surgical History             Past Surgical History:   Procedure Laterality Date    BREAST SURGERY   04/03/2014     Breast Surgery Reduction Procedure    HYSTEROSCOPY   02/19/2017     Hysteroscopy With Endometrial Ablation    OTHER SURGICAL HISTORY   11/19/2020     Shoulder surgery    OTHER SURGICAL HISTORY   04/03/2014     Oral Surgery            REVIEW OF SYSTEMS  DERM:   + as noted in HPI.         Physical examination:   On General Observation: Patient is a pleasant, cooperative, well developed 53 y.o. diabetic   adult female. The patient is alert and oriented to time, place and person. Patient has normal affect and mood.  Temp 36.6 °C (97.8 °F)      Vascular:  DP and PT pulses are 2/4 b/l.  mild edema noted. mild varicosities b/l.  CFT  6 seconds to all digits bilateral.  Skin temperature is warm to warm from proximal to distal bilateral.       Muscular: Strength is 5/5 for all instrinsic and extrinsic muscle groups.      Neuro:  Proprioception present.   Sensation to vibration is  present. Protective sensation is decreased toes via [place] Sam 5.07 monofilament bilateral.  Light touch present bilateral.      Derm:    Left toenails: 1-5 Brittleness, crumbling upon debridement, subungual debris, elongation, mycotic appearance, tenderness, and thickness.   Right toenails: 1-5 Brittleness, crumbling upon debridement, subungual debris, elongation, mycotic appearance, tenderness, and thickness.   Hair growth is decreased b/l le     Ortho:  Slight swelling and pain is present dorsal right foot.     ASSESSMENT:     Conutsion right foot  E11.65  Pain in right toe(s) [M79.674]   Pain  in left toe(s) [L44.440]         PLAN:     Exam  DM foot care and DM foot manifestations were reviewed.  The patient was educated on clinical findings, diagnosis and treatment plans. Patient understands all that has been explained and all questions were answered to apparent satisfaction. Working to lower sugars.           Deonte Wright DPM

## 2025-04-08 LAB
ALBUMIN SERPL-MCNC: 4.4 G/DL (ref 3.6–5.1)
ALP SERPL-CCNC: 163 U/L (ref 37–153)
ALT SERPL-CCNC: 24 U/L (ref 6–29)
ANION GAP SERPL CALCULATED.4IONS-SCNC: 11 MMOL/L (CALC) (ref 7–17)
AST SERPL-CCNC: 16 U/L (ref 10–35)
BILIRUB SERPL-MCNC: 0.3 MG/DL (ref 0.2–1.2)
BUN SERPL-MCNC: 13 MG/DL (ref 7–25)
CALCIUM SERPL-MCNC: 9.7 MG/DL (ref 8.6–10.4)
CHLORIDE SERPL-SCNC: 100 MMOL/L (ref 98–110)
CHOLEST SERPL-MCNC: 150 MG/DL
CHOLEST/HDLC SERPL: 4.4 (CALC)
CO2 SERPL-SCNC: 28 MMOL/L (ref 20–32)
CREAT SERPL-MCNC: 0.84 MG/DL (ref 0.5–1.03)
EGFRCR SERPLBLD CKD-EPI 2021: 82 ML/MIN/1.73M2
ERYTHROCYTE [DISTWIDTH] IN BLOOD BY AUTOMATED COUNT: 13.5 % (ref 11–15)
GLUCOSE SERPL-MCNC: 97 MG/DL (ref 65–139)
HCT VFR BLD AUTO: 36.1 % (ref 35–45)
HDLC SERPL-MCNC: 34 MG/DL
HGB BLD-MCNC: 12 G/DL (ref 11.7–15.5)
LDLC SERPL CALC-MCNC: 87 MG/DL (CALC)
MCH RBC QN AUTO: 27.9 PG (ref 27–33)
MCHC RBC AUTO-ENTMCNC: 33.2 G/DL (ref 32–36)
MCV RBC AUTO: 84 FL (ref 80–100)
NONHDLC SERPL-MCNC: 116 MG/DL (CALC)
PLATELET # BLD AUTO: 444 THOUSAND/UL (ref 140–400)
PMV BLD REES-ECKER: 9.4 FL (ref 7.5–12.5)
POTASSIUM SERPL-SCNC: 4.2 MMOL/L (ref 3.5–5.3)
PROT SERPL-MCNC: 7.7 G/DL (ref 6.1–8.1)
RBC # BLD AUTO: 4.3 MILLION/UL (ref 3.8–5.1)
SODIUM SERPL-SCNC: 139 MMOL/L (ref 135–146)
TRIGL SERPL-MCNC: 194 MG/DL
TSH SERPL-ACNC: 4.28 MIU/L
WBC # BLD AUTO: 10.3 THOUSAND/UL (ref 3.8–10.8)

## 2025-05-12 ENCOUNTER — APPOINTMENT (OUTPATIENT)
Dept: PRIMARY CARE | Facility: CLINIC | Age: 55
End: 2025-05-12
Payer: COMMERCIAL

## 2025-05-12 VITALS
BODY MASS INDEX: 37.54 KG/M2 | TEMPERATURE: 98.1 F | DIASTOLIC BLOOD PRESSURE: 90 MMHG | WEIGHT: 232.6 LBS | SYSTOLIC BLOOD PRESSURE: 160 MMHG

## 2025-05-12 DIAGNOSIS — R74.8 ABNORMAL AST AND ALT: ICD-10-CM

## 2025-05-12 DIAGNOSIS — E11.9 CONTROLLED TYPE 2 DIABETES MELLITUS WITHOUT COMPLICATION, WITHOUT LONG-TERM CURRENT USE OF INSULIN: ICD-10-CM

## 2025-05-12 DIAGNOSIS — D75.839 THROMBOCYTOSIS: ICD-10-CM

## 2025-05-12 DIAGNOSIS — E78.5 HYPERLIPIDEMIA, UNSPECIFIED HYPERLIPIDEMIA TYPE: ICD-10-CM

## 2025-05-12 DIAGNOSIS — I10 BENIGN ESSENTIAL HYPERTENSION: Primary | ICD-10-CM

## 2025-05-12 DIAGNOSIS — K76.0 FATTY LIVER: ICD-10-CM

## 2025-05-12 PROCEDURE — 3080F DIAST BP >= 90 MM HG: CPT | Performed by: INTERNAL MEDICINE

## 2025-05-12 PROCEDURE — 4010F ACE/ARB THERAPY RXD/TAKEN: CPT | Performed by: INTERNAL MEDICINE

## 2025-05-12 PROCEDURE — 1036F TOBACCO NON-USER: CPT | Performed by: INTERNAL MEDICINE

## 2025-05-12 PROCEDURE — 3077F SYST BP >= 140 MM HG: CPT | Performed by: INTERNAL MEDICINE

## 2025-05-12 PROCEDURE — 99214 OFFICE O/P EST MOD 30 MIN: CPT | Performed by: INTERNAL MEDICINE

## 2025-05-12 RX ORDER — BLOOD-GLUCOSE SENSOR
EACH MISCELLANEOUS
COMMUNITY
Start: 2025-05-09

## 2025-05-12 NOTE — PROGRESS NOTES
Subjective   Patient ID: Yu Small is a 54 y.o. female who presents for Follow-up (6 months).    Med Refill     Overall well  S/p oral surgery- no complications  #1 hypertension-  no recent checks at home, at endo= 130/76  #2 diabetes- following w/ endo, A1c=6.6 (4/25). on increased insulin.  #3 hyperlipidemia- liitle exercise.   #4 anemia/leukocytosis/thrombocytosis- no bleeding/bruising. Appt pending w/ heme  #5 rt shoulder- f/u ortho, information provided for second opinions.  #6 leg cramps-stable.  #7 tachycardia- normal Holter monitor. Follow-up cardiology  #8 fatty liver- f/u GI. appt pending, s/p remote Bx.   #9 subclinical hypothyroidism  #10 colon polyps. s/p colo (dr stout) 4/21--> f/u GI.       Review of Systems   All other systems reviewed and are negative.      Objective   There were no vitals taken for this visit.    Physical Exam  Constitutional:       Appearance: Normal appearance.   Cardiovascular:      Rate and Rhythm: Normal rate and regular rhythm.      Pulses: Normal pulses.      Heart sounds: Normal heart sounds. No murmur heard.  Pulmonary:      Effort: Pulmonary effort is normal. No respiratory distress.      Breath sounds: Normal breath sounds. No wheezing, rhonchi or rales.   Neurological:      Mental Status: She is alert.   Psychiatric:         Mood and Affect: Mood normal.         Behavior: Behavior normal.         Thought Content: Thought content normal.         Judgment: Judgment normal.     Lab Results   Component Value Date    WBC 10.3 04/07/2025    HGB 12.0 04/07/2025    HCT 36.1 04/07/2025     (H) 04/07/2025    CHOL 150 04/07/2025    TRIG 194 (H) 04/07/2025    HDL 34 (L) 04/07/2025    ALT 24 04/07/2025    AST 16 04/07/2025     04/07/2025    K 4.2 04/07/2025     04/07/2025    CREATININE 0.84 04/07/2025    BUN 13 04/07/2025    CO2 28 04/07/2025    TSH 4.28 04/07/2025    INR 1.0 02/10/2021    HGBA1C 8.2 (H) 05/10/2024     A1c- (11/3/24) =  7.5    Assessment/Plan     #1 hypertension- on target  #2 diabetes- follow-up endocrinology   #3 hyperlipidemia- on target ldl, Tgs up, reviewed v.  #4 anemia/leukocytosis/thrombocytosis- Follow-up with GI as well as hematology.   #5 rt shoulder- good.  f/u ortho  #6 leg cramps-stable.  #7 tachycardia- normal Holter monitor. Follow-up cardiology PRN  #8 fatty liver- f/u GI. appt pending, s/p Bx.   Appt pending 5/25  #9 subclinical hypothyroidism- better on rx. retest  6mths  #10 colon polyps. s/p colo (dr stout) 4/21--> f/u GI, appt pending today  #11 neuropathy- reviewed foot care.  f/u  w/ podiatry.  Gabapentin 100 mg po at bedtime  #12 foot infection- resolved.  f/u  PRN     Increased BMI- discussed diet and exercise  Follow-up gynecology for Pap/mammogram

## 2025-08-04 ENCOUNTER — APPOINTMENT (OUTPATIENT)
Dept: PODIATRY | Facility: CLINIC | Age: 55
End: 2025-08-04
Payer: COMMERCIAL

## 2025-08-04 DIAGNOSIS — E11.42 DIABETIC POLYNEUROPATHY ASSOCIATED WITH TYPE 2 DIABETES MELLITUS: Primary | ICD-10-CM

## 2025-08-04 DIAGNOSIS — M79.674 TOE PAIN, RIGHT: ICD-10-CM

## 2025-08-04 DIAGNOSIS — M79.675 TOE PAIN, LEFT: ICD-10-CM

## 2025-08-04 PROCEDURE — 99212 OFFICE O/P EST SF 10 MIN: CPT | Performed by: PODIATRIST

## 2025-08-04 PROCEDURE — 4010F ACE/ARB THERAPY RXD/TAKEN: CPT | Performed by: PODIATRIST

## 2025-08-04 NOTE — PROGRESS NOTES
CC: diabetic foot exam     HPI: Pt presents for dm foot exam, no acute issues, not checking the sugars. AIC is still elevated, improved at 6.6. On  metformin and insulin.     PCP: Dr. Rey  Last visit: 5-12-25     PMH  Medical History           Past Medical History:   Diagnosis Date    Anogenital (venereal) warts       Genital warts    Depression, unspecified 11/03/2013     Depression    Encounter for screening for malignant neoplasm of vagina       Vaginal Pap smear    Osteoarthritis of knee, unspecified 11/03/2013     Osteoarthritis of knee    Other conditions influencing health status       History of pregnancy         MEDS     Current Outpatient Medications:     amLODIPine (Norvasc) 10 mg tablet, Take 1 tablet (10 mg) by mouth once daily., Disp: 90 tablet, Rfl: 3    aspirin 81 mg chewable tablet, Chew once daily., Disp: , Rfl:     atorvastatin (Lipitor) 20 mg tablet, Take 1 tablet (20 mg) by mouth once daily., Disp: 90 tablet, Rfl: 2    Basaglar KwikPen U-100 Insulin 100 unit/mL (3 mL) pen, inject 65 units Subcutaneously once a day, Disp: , Rfl:     carvedilol (Coreg) 6.25 mg tablet, Take 1 tablet (6.25 mg) by mouth 2 times a day with meals., Disp: 180 tablet, Rfl: 3    coenzyme Q-10 100 mg capsule, Take 1 capsule (100 mg) by mouth once daily., Disp: , Rfl:     glimepiride (Amaryl) 4 mg tablet, TAKE 1 TABLET BY MOUTH EVERY DAY with breakfast or the first main meal OF the day 30, Disp: , Rfl:     hydroCHLOROthiazide (HYDRODiuril) 25 mg tablet, Take 1 tablet (25 mg) by mouth once daily., Disp: 90 tablet, Rfl: 3    levothyroxine (Synthroid, Levoxyl) 25 mcg tablet, Take 1 tablet (25 mcg) by mouth once daily in the morning. Take before meals., Disp: 90 tablet, Rfl: 3    lisinopril 40 mg tablet, Take 1 tablet (40 mg) by mouth once daily., Disp: 90 tablet, Rfl: 3    metFORMIN (Glucophage) 1,000 mg tablet, Take 1 tablet (1,000 mg) by mouth 2 times a day with meals., Disp: 180 tablet, Rfl: 3    multivitamin tablet, Take  "1 tablet by mouth once daily., Disp: , Rfl:     pantoprazole (ProtoNix) 40 mg EC tablet, Take 1 tablet (40 mg) by mouth once daily., Disp: , Rfl:     semaglutide (Ozempic) 1 mg/dose (2 mg/1.5 mL) pen injector, 1 (one) time per week., Disp: , Rfl:     vitamin E 180 mg (400 unit) capsule, Take 1 capsule (400 Units) by mouth once daily in the morning., Disp: , Rfl:     Accu-Chek Guide test strips strip, 2 times a day., Disp: , Rfl:     gabapentin (Neurontin) 100 mg capsule, Take 1 capsule (100 mg) by mouth once daily at bedtime., Disp: 90 capsule, Rfl: 1    lancets 30 gauge misc, 2 times a day., Disp: , Rfl:     pen needle, diabetic 32 gauge x 5/32\" needle, 2 times a day., Disp: , Rfl:     polyethylene glycol (Glycolax) 17 gram/dose powder, Take by mouth., Disp: , Rfl:     simvastatin (Zocor) 40 mg tablet, Take 1 tablet (40 mg) by mouth once daily at bedtime., Disp: 90 tablet, Rfl: 3  Allergies          Allergies   Allergen Reactions    Chlorthalidone Headache    Hydrocodone Unknown    Hydrocodone-Acetaminophen Itching    Levallorphan Unknown    Oxycodone Itching    Oxycodone-Acetaminophen Itching    Sulfamethoxazole Unknown    Trimethoprim Unknown    Hydromorphone Rash    Other Rash    Sulfamethoxazole-Trimethoprim Rash      Social History   Social History                Socioeconomic History    Marital status:        Spouse name: None    Number of children: None    Years of education: None    Highest education level: None   Occupational History    None   Tobacco Use    Smoking status: Never       Passive exposure: Never    Smokeless tobacco: Never   Substance and Sexual Activity    Alcohol use: Yes       Comment: rare    Drug use: Never    Sexual activity: None   Other Topics Concern    None   Social History Narrative    None      Social Determinants of Health      Financial Resource Strain: Not on file   Food Insecurity: Not on file   Transportation Needs: Not on file   Physical Activity: Not on file "   Stress: Not on file   Social Connections: Not on file   Intimate Partner Violence: Not on file   Housing Stability: Not on file         Family History               Family History   Problem Relation Name Age of Onset    Breast cancer Mother        Hypertension Father        Leukemia Father        Basal cell carcinoma Father             Surgical History             Past Surgical History:   Procedure Laterality Date    BREAST SURGERY   04/03/2014     Breast Surgery Reduction Procedure    HYSTEROSCOPY   02/19/2017     Hysteroscopy With Endometrial Ablation    OTHER SURGICAL HISTORY   11/19/2020     Shoulder surgery    OTHER SURGICAL HISTORY   04/03/2014     Oral Surgery            REVIEW OF SYSTEMS  DERM:   + as noted in HPI.         Physical examination:   On General Observation: Patient is a pleasant, cooperative, well developed 53 y.o. diabetic   adult female. The patient is alert and oriented to time, place and person. Patient has normal affect and mood.  Temp 36.6 °C (97.8 °F)      Vascular:  DP and PT pulses are 2/4 b/l.  mild edema noted. mild varicosities b/l.  CFT  6 seconds to all digits bilateral.  Skin temperature is warm to warm from proximal to distal bilateral.       Muscular: Strength is 5/5 for all instrinsic and extrinsic muscle groups.      Neuro:  Proprioception present.   Sensation to vibration is  present. Protective sensation is decreased toes via [place] Sam 5.07 monofilament bilateral.  Light touch present bilateral.      Derm:    Left toenails: 1-5 Brittleness, crumbling upon debridement, subungual debris, elongation, mycotic appearance, tenderness, and thickness.   Right toenails: 1-5 Brittleness, crumbling upon debridement, subungual debris, elongation, mycotic appearance, tenderness, and thickness.   Hair growth is decreased b/l le     Ortho:  Slight swelling and pain is present dorsal right foot.     ASSESSMENT:      E11.65  Pain in right toe(s) [M79.674]   Pain in left toe(s)  [Q09.458]         PLAN:      Exam  DM foot care and DM foot manifestations were reviewed.  The patient was educated on clinical findings, diagnosis and treatment plans. Patient understands all that has been explained and all questions were answered to apparent satisfaction. Working to lower sugars.           Deonte Wright DPM

## 2025-11-05 ENCOUNTER — APPOINTMENT (OUTPATIENT)
Dept: PRIMARY CARE | Facility: CLINIC | Age: 55
End: 2025-11-05
Payer: COMMERCIAL

## 2026-02-16 ENCOUNTER — APPOINTMENT (OUTPATIENT)
Dept: PODIATRY | Facility: CLINIC | Age: 56
End: 2026-02-16
Payer: COMMERCIAL